# Patient Record
Sex: FEMALE | Employment: FULL TIME | URBAN - METROPOLITAN AREA
[De-identification: names, ages, dates, MRNs, and addresses within clinical notes are randomized per-mention and may not be internally consistent; named-entity substitution may affect disease eponyms.]

---

## 2024-02-20 ENCOUNTER — OFFICE VISIT (OUTPATIENT)
Dept: OBGYN CLINIC | Facility: CLINIC | Age: 39
End: 2024-02-20

## 2024-02-20 VITALS — DIASTOLIC BLOOD PRESSURE: 72 MMHG | WEIGHT: 170.4 LBS | SYSTOLIC BLOOD PRESSURE: 112 MMHG

## 2024-02-20 DIAGNOSIS — Z3A.13 13 WEEKS GESTATION OF PREGNANCY: Primary | ICD-10-CM

## 2024-02-20 DIAGNOSIS — O21.0 HYPEREMESIS AFFECTING PREGNANCY, ANTEPARTUM: ICD-10-CM

## 2024-02-20 LAB
SL AMB  POCT GLUCOSE, UA: NEGATIVE
SL AMB POCT URINE PROTEIN: NEGATIVE

## 2024-02-20 PROCEDURE — 81002 URINALYSIS NONAUTO W/O SCOPE: CPT | Performed by: OBSTETRICS & GYNECOLOGY

## 2024-02-20 PROCEDURE — 99214 OFFICE O/P EST MOD 30 MIN: CPT | Performed by: OBSTETRICS & GYNECOLOGY

## 2024-02-20 RX ORDER — ONDANSETRON 8 MG/1
8 TABLET, ORALLY DISINTEGRATING ORAL EVERY 8 HOURS PRN
Qty: 20 TABLET | Refills: 0 | Status: SHIPPED | OUTPATIENT
Start: 2024-02-20

## 2024-02-20 NOTE — PROGRESS NOTES
Assessment     Iup 13w 3d       Plan     Begin prenatal care       Subjective   Tina Baron is a 38 y.o. female who presents for evaluation of amenorrhea. She believes she could be pregnant. Pregnancy is desired. Sexual Activity: single partner, contraception: none. Current symptoms also include: fatigue, morning sickness, nausea, and positive home pregnancy test. Last period was normal.     No LMP recorded (lmp unknown). Patient is pregnant.  The following portions of the patient's history were reviewed and updated as appropriate: allergies, current medications, past family history, past medical history, past social history, past surgical history, and problem list.    Review of Systems  Pertinent items are noted in HPI.       Objective   /72 (BP Location: Left arm, Patient Position: Sitting, Cuff Size: Standard)   Wt 77.3 kg (170 lb 6.4 oz)   LMP  (LMP Unknown)     General:   alert and oriented, in no acute distress   Heart: regular rate and rhythm, S1, S2 normal, no murmur, click, rub or gallop   Lungs: clear to auscultation bilaterally   Abdomen: soft, non-tender, without masses or organomegaly   Vulva: normal   Vagina: normal mucosa   Cervix: anteverted   Uterus: size consistent with 13 weeks   Adnexa: normal adnexa     Lab Review  Urine HCG: positive

## 2024-02-23 NOTE — PATIENT INSTRUCTIONS
.Congratulations!! Please review our Pregnancy Essential Guide and [facility] L&D Virtual tour from our networks website.     [facility] Pregnancy Essentials Guide  [facility] ([email or web address])     [facility] ([employer])

## 2024-02-26 ENCOUNTER — INITIAL PRENATAL (OUTPATIENT)
Dept: OBGYN CLINIC | Facility: CLINIC | Age: 39
End: 2024-02-26

## 2024-02-26 ENCOUNTER — TELEPHONE (OUTPATIENT)
Age: 39
End: 2024-02-26

## 2024-02-26 VITALS
BODY MASS INDEX: 26.87 KG/M2 | DIASTOLIC BLOOD PRESSURE: 80 MMHG | HEIGHT: 67 IN | SYSTOLIC BLOOD PRESSURE: 110 MMHG | WEIGHT: 171.2 LBS

## 2024-02-26 DIAGNOSIS — Z36.9 ENCOUNTER FOR ANTENATAL SCREENING: Primary | ICD-10-CM

## 2024-02-26 DIAGNOSIS — Z31.430 ENCOUNTER OF FEMALE FOR TESTING FOR GENETIC DISEASE CARRIER STATUS FOR PROCREATIVE MANAGEMENT: ICD-10-CM

## 2024-02-26 DIAGNOSIS — Z34.01 ENCOUNTER FOR SUPERVISION OF NORMAL FIRST PREGNANCY IN FIRST TRIMESTER: ICD-10-CM

## 2024-02-26 PROCEDURE — OBC

## 2024-02-26 NOTE — TELEPHONE ENCOUNTER
Left message on patient answering machine to call office to schedule Detailed US. No LMP noted need to confirm how far along she is.

## 2024-02-26 NOTE — PROGRESS NOTES
.  OB INTAKE INTERVIEW  Patient is 38 y.o.y.o. who presents for OB intake at 14wks 2 Days  She is accompanied by Herself during this encounter  The father of her baby (Rod ) is involved in the pregnancy and is 55 years old.      Last Menstrual Period: 2023  Ultrasound: Measured 13 weeks 1 days on 2024  Estimated Date of Delivery: 2024  Confirmed week  13.1 weeks US    Signs/Symptoms of Pregnancy  Current pregnancy symptoms: Tiredness, nausea, vomiting   Constipation no  Headaches YES, First 3 weeks of pregnancy   Cramping/spotting YES, spotting 5x since beginning of pregnancy   PICA cravings no    Diabetes-  Body mass index is 26.81 kg/m².  If patient has 1 or more, please order early 1 hour GTT  History of GDM no  BMI >35 no  History of PCOS or current metformin use no  History of LGA/macrosomic infant (4000g/9lbs) no    If patient has 2 or more, please order early 1 hour GTT  BMI>30 no  AMA YES  First degree relative with type 2 diabetes no  History of chronic HTN, hyperlipidemia, elevated A1C no  High risk race (, , ,  or ) no    Hypertension- if you answer yes to any of the following, please order baseline preeclampsia labs (cbc, comprehensive metabolic panel, urine protein creatinine ratio, uric acid)  History of of chronic HTN no  History of gestational HTN no  History of preeclampsia, eclampsia, or HELLP syndrome no  History of diabetes no  History of lupus, autoimmune disease, kidney disease no    Thyroid- if yes order TSH with reflex T4, Requested by patient   History of thyroid disease no    Bleeding Disorder or Hx of DVT-patient or first degree relative with history of. Order the following if not done previously.   (Factor V, antithrombin III, prothrombin gene mutation, protein C and S Ag, lupus anticoagulant, anticardiolipin, beta-2 glycoprotein)   no    OB/GYN-  History of abnormal pap smear no       Date of last pap smear  :  History of HPV no  History of Herpes/HSV no  History of other STI (gonorrhea, chlamydia, trich) no  History of prior  no  History of prior  no  History of  delivery prior to 36 weeks 6 days no  History of blood transfusion no  Ok for blood transfusion : NO    Substance screening-   History of tobacco use no  Currently using tobacco no  Substance Use Screen Level : LOW    MRSA Screening-   Does the pt have a hx of MRSA? no    Immunizations:  Influenza vaccine given this season : NO  Discussed Tdap vaccine : Yes  Discussed COVID Vaccine :yes    Genetic/[facility]-  Do you or your partner have a history of any of the following in yourselves or first degree relatives?  Cystic fibrosis no  Spinal muscular atrophy no  Hemoglobinopathy/Sickle Cell/Thalassemia no  Fragile X Intellectual Disability no    Discussed Carrier Screening being completed once in a lifetime as a standard of care lab test.       Appointment for Nuchal Translucency Ultrasound at [facility] scheduled for : patient missed by 1 week , Detailed Ultrasound  2024      Interview education  [facility] Pregnancy Essentials Book reviewed, discussed and attached to their AVS : YES    Nurse/Family Partnership- patient may qualify :NO    Prenatal lab work scripts : yes  Extra labs ordered:   Tsh/Free T 4    Aspirin/Preeclampsia Screen    Risk Level Risk Factor Recommendation   LOW Prior Uncomplicated full-term delivery no No Aspirin recommendation        MODERATE Nulliparity no Recommend low-dose aspirin if     BMI>30 no 2 or more moderate risk factors    Family History Preeclampsia (mother/sister) no     35yr old or greater YES      or Low Socioeconomic no     IVF Pregnancy  no     Personal History Risks (low birth weight, prior adverse preg outcome, >10yr preg interval) no         HIGH History of Preeclampsia no Recommend low-dose aspirin if     Multifetal gestation no 1 or more high risk factors    Chronic HTN no     Type 1  or 2 Diabetes no     Renal Disease no     Autoimmune Disease  no      Contraindications to ASA therapy:  NSAID/ ASA allergy: no  Nasal polyps: no  Asthma with history of ASA induced bronchospasm: no  Relative contraindications:  History of GI bleed: no  Active peptic ulcer disease: no  Severe hepatic dysfunction: no    Patient should be recommended to take ASA 162mg during this pregnancy from 12-36wks to lower her risk of preeclampsia:  Discussed      The patient has a history now or in prior pregnancy notable for:NONE        Details that I feel the provider should be aware of:  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 The patient was oriented to our practice, the navigator role, reviewed delivering physicians and  [facility] for Delivery. All questions were answered.    Interviewed by:  Radha Silva LPN, OB Nurse Navigator

## 2024-02-29 ENCOUNTER — CLINICAL SUPPORT (OUTPATIENT)
Facility: HOSPITAL | Age: 39
End: 2024-02-29

## 2024-02-29 ENCOUNTER — OB ABSTRACT (OUTPATIENT)
Dept: OBGYN CLINIC | Facility: CLINIC | Age: 39
End: 2024-02-29

## 2024-02-29 DIAGNOSIS — Z36.0 ENCOUNTER FOR ANTENATAL SCREENING FOR CHROMOSOMAL ANOMALIES: Primary | ICD-10-CM

## 2024-02-29 DIAGNOSIS — Z3A.14 14 WEEKS GESTATION OF PREGNANCY: ICD-10-CM

## 2024-02-29 PROCEDURE — NC001 PR NO CHARGE

## 2024-02-29 NOTE — PROGRESS NOTES
Patient attended the Genetic Screening Education Session via [employer].    We reviewed basic chromosome information and the increasing risk for aneuploidy based on patient age. Copy number variants (microdeletions/duplications) were also reviewed with a general population risk of 0.4% in any pregnancy.    We discussed the options for prenatal screening and diagnosis for chromosomal abnormalities.     The benefits and limitations of fetal anatomy ultrasound at 20 weeks gestation were reviewed.    Quad screening consists of second trimester biochemical analysis. Results provide a numerical risk for Down syndrome, trisomy 18, and open neural tube defects. Group attendees were instructed to contact their OB office if they desired quad screen.    Cell-free fetal DNA (NIPT) screening analyzes placental DNA in maternal serum and can assess the risk for Down syndrome, trisomy 18, trisomy 13, and sex chromosome anomalies. Results report as screen negative or screen positive, and fetal sex information is provided. The possibility of no-result and increased risk result was addressed. Maternal serum AFP screening is recommended at 16-18 weeks to screen for open neural tube defects.    The benefits and limitations of these screens, including detection rates and false positive rates, were reviewed.    Prenatal diagnostic testing is available via amniocentesis. The timing, risks (including their associated risks of miscarriage) and benefits were reviewed.     Lastly, we reviewed that all pregnancies have a 3-6% risk of birth defect, genetic condition, or intellectual disability regardless of age or personal/family history. There is no available testing to rule out all conditions.     We reviewed potential costs associated with cell-free fetal DNA (NIPT) screen and provided resources, including information to check out of pocket cost with their insurance. The self pay price of $299 was also discussed.     Patient was the only class  attendee so we discussed her testing decisions at the end of the class. A nurse visit for [id number] blood draw was scheduled for 3/1/2024 at our [facility] location at 3:45 PM. She may choose to  a kit and take it to the [facility] outpatient lab as she has other blood work to complete. Reviewed that the NIPT can screen for sex chromosome abnormalities and the fetal sex which the patient elected NOT to learn. Discussed that the results take about 7-10 days and will be available in her MyChart to review. Patient expressed verbal understanding and had no questions. She was encouraged to call the genetic counseling office with any concerns.

## 2024-03-01 ENCOUNTER — CLINICAL SUPPORT (OUTPATIENT)
Facility: HOSPITAL | Age: 39
End: 2024-03-01

## 2024-03-01 ENCOUNTER — APPOINTMENT (OUTPATIENT)
Dept: LAB | Facility: CLINIC | Age: 39
End: 2024-03-01
Payer: COMMERCIAL

## 2024-03-01 DIAGNOSIS — Z36.9 ENCOUNTER FOR ANTENATAL SCREENING: ICD-10-CM

## 2024-03-01 DIAGNOSIS — O09.511 SUPERVISION OF ELDERLY PRIMIGRAVIDA IN FIRST TRIMESTER: ICD-10-CM

## 2024-03-01 DIAGNOSIS — O09.511 SUPERVISION OF ELDERLY PRIMIGRAVIDA IN FIRST TRIMESTER: Primary | ICD-10-CM

## 2024-03-01 DIAGNOSIS — Z92.89 STATUS POST ALCOHOL DETOXIFICATION: ICD-10-CM

## 2024-03-01 DIAGNOSIS — Z00.00 ROUTINE GENERAL MEDICAL EXAMINATION AT A HEALTH CARE FACILITY: ICD-10-CM

## 2024-03-01 DIAGNOSIS — Z11.59 SCREENING EXAMINATION FOR POLIOMYELITIS: ICD-10-CM

## 2024-03-01 DIAGNOSIS — Z11.4 SCREENING FOR HUMAN IMMUNODEFICIENCY VIRUS: ICD-10-CM

## 2024-03-01 DIAGNOSIS — Z34.01 ENCOUNTER FOR SUPERVISION OF NORMAL FIRST PREGNANCY IN FIRST TRIMESTER: ICD-10-CM

## 2024-03-01 LAB
25(OH)D3 SERPL-MCNC: 33.5 NG/ML (ref 30–100)
ABO GROUP BLD: NORMAL
ALBUMIN SERPL BCP-MCNC: 3.8 G/DL (ref 3.5–5)
ALP SERPL-CCNC: 74 U/L (ref 34–104)
ALT SERPL W P-5'-P-CCNC: 58 U/L (ref 7–52)
ANION GAP SERPL CALCULATED.3IONS-SCNC: 8 MMOL/L
AST SERPL W P-5'-P-CCNC: 24 U/L (ref 13–39)
BASOPHILS # BLD AUTO: 0.04 THOUSANDS/ÂΜL (ref 0–0.1)
BASOPHILS NFR BLD AUTO: 0 % (ref 0–1)
BILIRUB SERPL-MCNC: 0.36 MG/DL (ref 0.2–1)
BILIRUB UR QL STRIP: NEGATIVE
BLD GP AB SCN SERPL QL: NEGATIVE
BUN SERPL-MCNC: 4 MG/DL (ref 5–25)
CALCIUM SERPL-MCNC: 9 MG/DL (ref 8.4–10.2)
CHLORIDE SERPL-SCNC: 102 MMOL/L (ref 96–108)
CLARITY UR: CLEAR
CO2 SERPL-SCNC: 23 MMOL/L (ref 21–32)
COLOR UR: NORMAL
CREAT SERPL-MCNC: 0.47 MG/DL (ref 0.6–1.3)
EOSINOPHIL # BLD AUTO: 0.07 THOUSAND/ÂΜL (ref 0–0.61)
EOSINOPHIL NFR BLD AUTO: 1 % (ref 0–6)
ERYTHROCYTE [DISTWIDTH] IN BLOOD BY AUTOMATED COUNT: 12 % (ref 11.6–15.1)
EST. AVERAGE GLUCOSE BLD GHB EST-MCNC: 97 MG/DL
GFR SERPL CREATININE-BSD FRML MDRD: 125 ML/MIN/1.73SQ M
GLUCOSE SERPL-MCNC: 87 MG/DL (ref 65–140)
GLUCOSE UR STRIP-MCNC: NEGATIVE MG/DL
HBA1C MFR BLD: 5 %
HCT VFR BLD AUTO: 37.2 % (ref 34.8–46.1)
HGB BLD-MCNC: 12.8 G/DL (ref 11.5–15.4)
HGB UR QL STRIP.AUTO: NEGATIVE
HIV 1+2 AB+HIV1 P24 AG SERPL QL IA: NORMAL
HIV 2 AB SERPL QL IA: NORMAL
HIV1 AB SERPL QL IA: NORMAL
HIV1 P24 AG SERPL QL IA: NORMAL
IMM GRANULOCYTES # BLD AUTO: 0.06 THOUSAND/UL (ref 0–0.2)
IMM GRANULOCYTES NFR BLD AUTO: 1 % (ref 0–2)
KETONES UR STRIP-MCNC: NEGATIVE MG/DL
LEUKOCYTE ESTERASE UR QL STRIP: NEGATIVE
LYMPHOCYTES # BLD AUTO: 2.5 THOUSANDS/ÂΜL (ref 0.6–4.47)
LYMPHOCYTES NFR BLD AUTO: 25 % (ref 14–44)
MCH RBC QN AUTO: 30.6 PG (ref 26.8–34.3)
MCHC RBC AUTO-ENTMCNC: 34.4 G/DL (ref 31.4–37.4)
MCV RBC AUTO: 89 FL (ref 82–98)
MONOCYTES # BLD AUTO: 0.87 THOUSAND/ÂΜL (ref 0.17–1.22)
MONOCYTES NFR BLD AUTO: 9 % (ref 4–12)
NEUTROPHILS # BLD AUTO: 6.68 THOUSANDS/ÂΜL (ref 1.85–7.62)
NEUTS SEG NFR BLD AUTO: 64 % (ref 43–75)
NITRITE UR QL STRIP: NEGATIVE
NRBC BLD AUTO-RTO: 0 /100 WBCS
PH UR STRIP.AUTO: 6.5 [PH]
PLATELET # BLD AUTO: 309 THOUSANDS/UL (ref 149–390)
PMV BLD AUTO: 9.3 FL (ref 8.9–12.7)
POTASSIUM SERPL-SCNC: 3.7 MMOL/L (ref 3.5–5.3)
PROT SERPL-MCNC: 7.2 G/DL (ref 6.4–8.4)
PROT UR STRIP-MCNC: NEGATIVE MG/DL
RBC # BLD AUTO: 4.18 MILLION/UL (ref 3.81–5.12)
RH BLD: POSITIVE
RUBV IGG SERPL IA-ACNC: 237.6 IU/ML
SODIUM SERPL-SCNC: 133 MMOL/L (ref 135–147)
SP GR UR STRIP.AUTO: 1.01 (ref 1–1.03)
SPECIMEN EXPIRATION DATE: NORMAL
TSH SERPL DL<=0.05 MIU/L-ACNC: 5.08 UIU/ML (ref 0.45–4.5)
UROBILINOGEN UR STRIP-ACNC: <2 MG/DL
VIT B12 SERPL-MCNC: 348 PG/ML (ref 180–914)
VZV IGG SER QL IA: NORMAL
WBC # BLD AUTO: 10.22 THOUSAND/UL (ref 4.31–10.16)

## 2024-03-01 PROCEDURE — 87389 HIV-1 AG W/HIV-1&-2 AB AG IA: CPT

## 2024-03-01 PROCEDURE — 87147 CULTURE TYPE IMMUNOLOGIC: CPT

## 2024-03-01 PROCEDURE — 86787 VARICELLA-ZOSTER ANTIBODY: CPT

## 2024-03-01 PROCEDURE — 86900 BLOOD TYPING SEROLOGIC ABO: CPT

## 2024-03-01 PROCEDURE — 83020 HEMOGLOBIN ELECTROPHORESIS: CPT

## 2024-03-01 PROCEDURE — 86706 HEP B SURFACE ANTIBODY: CPT

## 2024-03-01 PROCEDURE — 84439 ASSAY OF FREE THYROXINE: CPT

## 2024-03-01 PROCEDURE — 36415 COLL VENOUS BLD VENIPUNCTURE: CPT

## 2024-03-01 PROCEDURE — 80053 COMPREHEN METABOLIC PANEL: CPT

## 2024-03-01 PROCEDURE — 87340 HEPATITIS B SURFACE AG IA: CPT

## 2024-03-01 PROCEDURE — 86762 RUBELLA ANTIBODY: CPT

## 2024-03-01 PROCEDURE — 81003 URINALYSIS AUTO W/O SCOPE: CPT

## 2024-03-01 PROCEDURE — 86780 TREPONEMA PALLIDUM: CPT

## 2024-03-01 PROCEDURE — 86480 TB TEST CELL IMMUN MEASURE: CPT

## 2024-03-01 PROCEDURE — 82607 VITAMIN B-12: CPT

## 2024-03-01 PROCEDURE — 85025 COMPLETE CBC W/AUTO DIFF WBC: CPT

## 2024-03-01 PROCEDURE — 84443 ASSAY THYROID STIM HORMONE: CPT

## 2024-03-01 PROCEDURE — 86901 BLOOD TYPING SEROLOGIC RH(D): CPT

## 2024-03-01 PROCEDURE — 82306 VITAMIN D 25 HYDROXY: CPT

## 2024-03-01 PROCEDURE — 86803 HEPATITIS C AB TEST: CPT

## 2024-03-01 PROCEDURE — 83036 HEMOGLOBIN GLYCOSYLATED A1C: CPT

## 2024-03-01 PROCEDURE — 87086 URINE CULTURE/COLONY COUNT: CPT

## 2024-03-01 PROCEDURE — 86850 RBC ANTIBODY SCREEN: CPT

## 2024-03-01 NOTE — PROGRESS NOTES
Patient chose to have [facility] [id number] Non-Invasive Prenatal Screen 704404 MT21 PLUS Core + SCA, NO gender.  Patient choose billed through insurance.     Patient given brochure and is aware [facility] will contact patient's insurance and coordinate coverage.  Provided [facility] contact information. General inquiries [phone], Cost estimates [phone] and [facility] Billing [phone]. Website [email or web address].     Blood collection tubes labeled with patient identifiers (name, medical record number, and date of birth).     Filled out [facility] order form. Patient chose to be given a test kit. Patient instructed to take to a [facility] lab for blood collection..   If patient chose to have blood work drawn at a [facility] lab we requested patient notify MFM (via phone call or VeriTweet message) when blood collected so office can follow up on results.     Copy of lab order scanned to Epic media.     Maternal Fetal Medicine will have results in approximately 5-7 business days and will call patient or notify via VeriTweet.  Patient aware viewing lab result online will reveal fetal sex if ordered.    Patient verbalized understanding of all instructions and no questions at this time.

## 2024-03-02 LAB
HBV SURFACE AB SER-ACNC: >500 MIU/ML
HBV SURFACE AG SER QL: NORMAL
HCV AB SER QL: NORMAL
T4 FREE SERPL-MCNC: 0.82 NG/DL (ref 0.61–1.12)
TREPONEMA PALLIDUM IGG+IGM AB [PRESENCE] IN SERUM OR PLASMA BY IMMUNOASSAY: NORMAL

## 2024-03-03 LAB
BACTERIA UR CULT: ABNORMAL
BACTERIA UR CULT: ABNORMAL
GAMMA INTERFERON BACKGROUND BLD IA-ACNC: 0.2 IU/ML
M TB IFN-G BLD-IMP: NEGATIVE
M TB IFN-G CD4+ BCKGRND COR BLD-ACNC: 0.06 IU/ML
M TB IFN-G CD4+ BCKGRND COR BLD-ACNC: 0.08 IU/ML
MITOGEN IGNF BCKGRD COR BLD-ACNC: 9.8 IU/ML

## 2024-03-05 ENCOUNTER — TELEPHONE (OUTPATIENT)
Dept: OBGYN CLINIC | Facility: CLINIC | Age: 39
End: 2024-03-05

## 2024-03-05 LAB — MISCELLANEOUS LAB TEST RESULT: NORMAL

## 2024-03-05 NOTE — TELEPHONE ENCOUNTER
Pt called back. Pt awaiting for call back for OB assessment. Pt stated she will be on break 1-2pm.

## 2024-03-05 NOTE — TELEPHONE ENCOUNTER
.Overall how are you doing? Has some nausea,lower back pain, itchy skin headaches,rash on her legs that itches,has elevated levels of thyroid hormone from living close to [facility]     Compliant with routine OB care appointments? yes    Have you completed your 1st trimester labs? yes    If you had NIPS with MFM, do you have a order for MSAFP? yes   Can be completed 15w-22w9d, ideally 16w-18w    Have you seen MFM and do you have your detailed US scheduled? 4/16/2024    Pregnancy Education-have you had a chance to review the classes offered and registered? No not yet    EPDS Score : zero

## 2024-03-05 NOTE — TELEPHONE ENCOUNTER
Has some nausea,lower back pain, itchy skin headaches,rash on her legs that itches,has elevated levels of thyroid hormone from living close to [place],is their medication she should be taking

## 2024-03-05 NOTE — TELEPHONE ENCOUNTER
Called the patient and left a voice message on her machine to return the call for a 2nd trimester OB assessment

## 2024-03-06 LAB
HGB A MFR BLD: 2.6 % (ref 1.8–3.2)
HGB A MFR BLD: 97.4 % (ref 96.4–98.8)
HGB F MFR BLD: 0 % (ref 0–2)
HGB FRACT BLD-IMP: NORMAL
HGB S MFR BLD: 0 %

## 2024-03-07 ENCOUNTER — TELEPHONE (OUTPATIENT)
Age: 39
End: 2024-03-07

## 2024-03-07 DIAGNOSIS — R79.89 ELEVATED TSH: Primary | ICD-10-CM

## 2024-03-07 LAB
CFDNA.FET/CFDNA.TOTAL SFR FETUS: NORMAL %
CITATION REF LAB TEST: NORMAL
FET 13+18+21+X+Y ANEUP PLAS.CFDNA: NEGATIVE
FET CHR 21 TS PLAS.CFDNA QL: NEGATIVE
FET CHR 21 TS PLAS.CFDNA QL: NEGATIVE
FET MS X RISK WBC.DNA+CFDNA QL: NOT DETECTED
FET SEX PLAS.CFDNA DOSAGE CFDNA: NORMAL
FET TS 13 RISK PLAS.CFDNA QL: NEGATIVE
FET X + Y ANEUP RISK PLAS.CFDNA SEQ-IMP: NOT DETECTED
GA EST FROM CONCEPTION DATE: NORMAL D
GESTATIONAL AGE > 9:: YES
LAB DIRECTOR NAME PROVIDER: NORMAL
LAB DIRECTOR NAME PROVIDER: NORMAL
LABORATORY COMMENT REPORT: NORMAL
LIMITATIONS OF THE TEST: NORMAL
NEGATIVE PREDICTIVE VALUE: NORMAL
PERFORMANCE CHARACTERISTICS: NORMAL
POSITIVE PREDICTIVE VALUE: NORMAL
REF LAB TEST METHOD: NORMAL
SL AMB NOTE:: NORMAL
TEST PERFORMANCE INFO SPEC: NORMAL

## 2024-03-07 NOTE — TELEPHONE ENCOUNTER
----- Message from Jamal Regalado MD sent at 3/7/2024  1:05 PM EST -----  I have reviewed the results of the NIPS which are low risk.  Please call patient and notify her of these reassuring results if she has not viewed on MyChart. Please ensure she is notified of recommendation of MSAFP to be ordered and followed up through her primary Obstetrician's office.      Thank you, Jamal Regalado MD

## 2024-03-07 NOTE — TELEPHONE ENCOUNTER
Spoke with the patient and made aware of results and recommendations, she will check with her insurance to see which endocrinologist she can go to and she will call them to schedule an appointment

## 2024-03-07 NOTE — RESULT ENCOUNTER NOTE
I have reviewed the results of the NIPS which are low risk.  Please call patient and notify her of these reassuring results if she has not viewed on MyChart. Please ensure she is notified of recommendation of MSAFP to be ordered and followed up through her primary Obstetrician's office.      Thank you, Jamal Regalado MD

## 2024-03-18 ENCOUNTER — INITIAL PRENATAL (OUTPATIENT)
Dept: OBGYN CLINIC | Facility: CLINIC | Age: 39
End: 2024-03-18
Payer: COMMERCIAL

## 2024-03-18 VITALS — DIASTOLIC BLOOD PRESSURE: 64 MMHG | BODY MASS INDEX: 27.72 KG/M2 | WEIGHT: 177 LBS | SYSTOLIC BLOOD PRESSURE: 106 MMHG

## 2024-03-18 DIAGNOSIS — Z3A.17 17 WEEKS GESTATION OF PREGNANCY: Primary | ICD-10-CM

## 2024-03-18 LAB
SL AMB  POCT GLUCOSE, UA: NEGATIVE
SL AMB LEUKOCYTE ESTERASE,UA: NEGATIVE
SL AMB POCT CLARITY,UA: CLEAR
SL AMB POCT COLOR,UA: NORMAL
SL AMB POCT NITRITE,UA: NEGATIVE
SL AMB POCT URINE PROTEIN: NEGATIVE

## 2024-03-18 PROCEDURE — PNV: Performed by: OBSTETRICS & GYNECOLOGY

## 2024-03-18 PROCEDURE — 87591 N.GONORRHOEAE DNA AMP PROB: CPT | Performed by: OBSTETRICS & GYNECOLOGY

## 2024-03-18 PROCEDURE — 87491 CHLMYD TRACH DNA AMP PROBE: CPT | Performed by: OBSTETRICS & GYNECOLOGY

## 2024-03-18 PROCEDURE — G0476 HPV COMBO ASSAY CA SCREEN: HCPCS | Performed by: OBSTETRICS & GYNECOLOGY

## 2024-03-18 PROCEDURE — G0145 SCR C/V CYTO,THINLAYER,RESCR: HCPCS | Performed by: OBSTETRICS & GYNECOLOGY

## 2024-03-18 PROCEDURE — 81003 URINALYSIS AUTO W/O SCOPE: CPT | Performed by: OBSTETRICS & GYNECOLOGY

## 2024-03-18 NOTE — PROGRESS NOTES
Patient is a 38-year-old  1 para 0 who presents at 17 weeks 0 days gestation for routine prenatal exam.  She is persistent nausea and vomiting.  She has not as of yet felt fetal movement.  She denies leaking of fluid, vaginal bleeding, contractions or pain.  Will see her back in 4 weeks or as needed.

## 2024-03-18 NOTE — PROGRESS NOTES
Tina Baron is here for her initial ob appt, pap and GC/c ordered and to be colected today. Pt is approxiamately 17 wks today.  Pt is having a lot of nausea, weird taste in mouth, ears hurt.  Pt isn't feeling any movement yet.  Pt denies any LOF, spotting, cramping. Pt did think that she was getting an U/S today, and is very nervous to have an internal exam.

## 2024-03-19 LAB
HPV HR 12 DNA CVX QL NAA+PROBE: NEGATIVE
HPV16 DNA CVX QL NAA+PROBE: NEGATIVE
HPV18 DNA CVX QL NAA+PROBE: NEGATIVE

## 2024-03-21 LAB
C TRACH DNA SPEC QL NAA+PROBE: NEGATIVE
N GONORRHOEA DNA SPEC QL NAA+PROBE: NEGATIVE

## 2024-03-22 LAB
LAB AP GYN PRIMARY INTERPRETATION: NORMAL
LAB AP LMP: NORMAL
Lab: NORMAL

## 2024-04-04 ENCOUNTER — APPOINTMENT (OUTPATIENT)
Dept: LAB | Facility: CLINIC | Age: 39
End: 2024-04-04
Payer: COMMERCIAL

## 2024-04-04 DIAGNOSIS — Z3A.17 17 WEEKS GESTATION OF PREGNANCY: ICD-10-CM

## 2024-04-04 PROCEDURE — 82105 ALPHA-FETOPROTEIN SERUM: CPT

## 2024-04-04 PROCEDURE — 36415 COLL VENOUS BLD VENIPUNCTURE: CPT

## 2024-04-06 LAB
2ND TRIMESTER 4 SCREEN SERPL-IMP: NORMAL
AFP ADJ MOM SERPL: 1.17
AFP INTERP AMN-IMP: NORMAL
AFP INTERP SERPL-IMP: NORMAL
AFP INTERP SERPL-IMP: NORMAL
AFP SERPL-MCNC: 57.9 NG/ML
AGE AT DELIVERY: 39.2 YR
GA METHOD: NORMAL
GA: 19.6 WEEKS
IDDM PATIENT QL: NO
MULTIPLE PREGNANCY: NO
NEURAL TUBE DEFECT RISK FETUS: 7137 %

## 2024-04-16 ENCOUNTER — ROUTINE PRENATAL (OUTPATIENT)
Facility: HOSPITAL | Age: 39
End: 2024-04-16
Payer: COMMERCIAL

## 2024-04-16 VITALS
BODY MASS INDEX: 28.06 KG/M2 | HEART RATE: 95 BPM | HEIGHT: 67 IN | SYSTOLIC BLOOD PRESSURE: 120 MMHG | WEIGHT: 178.79 LBS | DIASTOLIC BLOOD PRESSURE: 82 MMHG

## 2024-04-16 DIAGNOSIS — O09.512 AMA (ADVANCED MATERNAL AGE) PRIMIGRAVIDA 35+, SECOND TRIMESTER: ICD-10-CM

## 2024-04-16 DIAGNOSIS — Z36.3 ENCOUNTER FOR ANTENATAL SCREENING FOR MALFORMATIONS: ICD-10-CM

## 2024-04-16 DIAGNOSIS — Z36.86 ENCOUNTER FOR ANTENATAL SCREENING FOR CERVICAL LENGTH: Primary | ICD-10-CM

## 2024-04-16 DIAGNOSIS — Z3A.21 21 WEEKS GESTATION OF PREGNANCY: ICD-10-CM

## 2024-04-16 PROBLEM — Z3A.20 20 WEEKS GESTATION OF PREGNANCY: Status: ACTIVE | Noted: 2024-04-16

## 2024-04-16 PROCEDURE — 76817 TRANSVAGINAL US OBSTETRIC: CPT | Performed by: OBSTETRICS & GYNECOLOGY

## 2024-04-16 PROCEDURE — 76811 OB US DETAILED SNGL FETUS: CPT | Performed by: OBSTETRICS & GYNECOLOGY

## 2024-04-16 PROCEDURE — 99243 OFF/OP CNSLTJ NEW/EST LOW 30: CPT | Performed by: OBSTETRICS & GYNECOLOGY

## 2024-04-16 RX ORDER — ASPIRIN 81 MG/1
162 TABLET, CHEWABLE ORAL DAILY
Qty: 60 TABLET | Refills: 4 | Status: SHIPPED | OUTPATIENT
Start: 2024-04-16

## 2024-04-16 NOTE — PROGRESS NOTES
"[facility]: Ms. Baron was seen today at 21w3d for anatomic survey and cervical length screening ultrasound.  See ultrasound report under \"OB Procedures\" tab.      My recommendations are as follows:  Although encouraging, even a normal-appearing ultrasound cannot exclude all malformations, or the possibility of a genetic syndrome.      The use of low dose aspirin in pregnancy (162mg) is recommended in women with a high risk, or multiple moderate risk factors for preeclampsia. Aspirin therapy should be initiated between 12-28 weeks gestation, and is most effective if started prior to 16 weeks gestation, and stopped by 36 weeks gestation. Low dose aspirin in pregnancy has been shown to reduce the incidence of preeclampsia in women with risk factors, and has been shown to be safe and without significant maternal or fetal risk. In light of her risk factors which include primigravia and maternal age, I recommend initiating aspirin therapy.     Advanced Maternal Age (AMA) is defined as maternal age 35 or greater at EDC. Advanced maternal age is associated with an increased risk of several pregnancy outcomes, including aneuploidy/genetic syndromes, poor fetal growth, stillbirth, maternal hypertensive disorders, and gestational diabetes.  Risk of adverse outcomes is proportional to patient age. Despite these increased risks, many women of advanced maternal age have normal, healthy pregnancy outcomes, particularly if they have no co-existing medical conditions.  For women age 35 and older, we recommend an ultrasound at 30-32 weeks to assess fetal growth for the indication of advanced maternal age.     Please don't hesitate to contact our office with any concerns or questions.    -Lynne Boles MD      "

## 2024-04-16 NOTE — LETTER
"2024     Xiomara Francois MD  [address]  [address]  [place] [address]    Patient: Tina Baron   YOB: 1985   Date of Visit: 2024       Dear Dr. Francois:    Thank you for referring Tina Baron to me for evaluation. Below are my notes for this consultation.    If you have questions, please do not hesitate to call me. I look forward to following your patient along with you.         Sincerely,        Lynne Boles MD        CC: No Recipients    Lynne Boles MD  2024  5:27 PM  Sign when Signing Visit  [facility]: Ms. Baron was seen today at 21w3d for anatomic survey and cervical length screening ultrasound.  See ultrasound report under \"OB Procedures\" tab.      My recommendations are as follows:  Although encouraging, even a normal-appearing ultrasound cannot exclude all malformations, or the possibility of a genetic syndrome.      The use of low dose aspirin in pregnancy (162mg) is recommended in women with a high risk, or multiple moderate risk factors for preeclampsia. Aspirin therapy should be initiated between 12-28 weeks gestation, and is most effective if started prior to 16 weeks gestation, and stopped by 36 weeks gestation. Low dose aspirin in pregnancy has been shown to reduce the incidence of preeclampsia in women with risk factors, and has been shown to be safe and without significant maternal or fetal risk. In light of her risk factors which include primigravia and maternal age, I recommend initiating aspirin therapy.     Advanced Maternal Age (AMA) is defined as maternal age 35 or greater at EDC. Advanced maternal age is associated with an increased risk of several pregnancy outcomes, including aneuploidy/genetic syndromes, poor fetal growth, stillbirth, maternal hypertensive disorders, and gestational diabetes.  Risk of adverse outcomes is proportional to patient age. Despite these increased risks, " many women of advanced maternal age have normal, healthy pregnancy outcomes, particularly if they have no co-existing medical conditions.  For women age 35 and older, we recommend an ultrasound at 30-32 weeks to assess fetal growth for the indication of advanced maternal age.     Please don't hesitate to contact our office with any concerns or questions.    -MD Sharif Lobo MD  4/16/2024 10:43 AM  Sign when Signing Visit  Note entered in error t

## 2024-04-16 NOTE — PATIENT INSTRUCTIONS
Please call [phone] to inquire regarding prior authorization for cystic fibrosis and spinal muscular atrophy testing.    The use of low dose aspirin in pregnancy (162mg) is recommended in women with a high risk, or multiple moderate risk factors for preeclampsia. Aspirin therapy should be initiated between 12-28 weeks gestation, and is most effective if started prior to 16 weeks gestation, and continued until 36 weeks gestation. Low dose aspirin in pregnancy has been shown to reduce the incidence of preeclampsia in women with risk factors, and has been shown to be safe and without significant maternal or fetal risk. In light of your risk factors for preeclampsia, including: Primiparity (first pregnancy) and Maternal Age 35 or greater I recommend initiating aspirin therapy.

## 2024-04-17 ENCOUNTER — ROUTINE PRENATAL (OUTPATIENT)
Dept: OBGYN CLINIC | Facility: CLINIC | Age: 39
End: 2024-04-17
Payer: COMMERCIAL

## 2024-04-17 ENCOUNTER — TELEPHONE (OUTPATIENT)
Facility: HOSPITAL | Age: 39
End: 2024-04-17

## 2024-04-17 VITALS
BODY MASS INDEX: 28.41 KG/M2 | HEIGHT: 67 IN | SYSTOLIC BLOOD PRESSURE: 122 MMHG | DIASTOLIC BLOOD PRESSURE: 74 MMHG | WEIGHT: 181 LBS

## 2024-04-17 DIAGNOSIS — Z3A.21 21 WEEKS GESTATION OF PREGNANCY: Primary | ICD-10-CM

## 2024-04-17 LAB
SL AMB  POCT GLUCOSE, UA: NORMAL
SL AMB LEUKOCYTE ESTERASE,UA: NORMAL
SL AMB POCT NITRITE,UA: NORMAL
SL AMB POCT URINE PROTEIN: NORMAL

## 2024-04-17 PROCEDURE — PNV: Performed by: PHYSICIAN ASSISTANT

## 2024-04-17 PROCEDURE — 81002 URINALYSIS NONAUTO W/O SCOPE: CPT | Performed by: PHYSICIAN ASSISTANT

## 2024-04-17 NOTE — PROGRESS NOTES
Assessment     Pregnancy 21 and 4/7 weeks     Plan     Routine OB visit doing well overall.  Patient noting some headaches but is primarily triggered by using computer when working or with weather.  Can consider use of appropriate glasses for electronics, headache management includes Tylenol, magnesium, staying well-hydrated.  We will monitor.  Nausea/vomiting has improved  Some fatigue which could be part of pregnancy, encouraged continuing with vitamins, staying well-hydrated, well-balanced diet and exercise/activity as tolerated.  Of note, patient had no evidence of anemia with prenatal labs.    Patient completed prenatal labs with slightly elevated TSH 5.081, normal free T4.  She has already been referred to endocrinology with appointment scheduled May 31.    NIPS/AFP were negative.    Patient saw MFM yesterday for level 2 which was normal.  Follow-up ultrasound scheduled 7/2.    Blood pressure today 122/74,  with fetal movement  AB+ blood type  Urine dip trace protein otherwise unremarkable.    Patient encouraged to continue PNV, to  ASA and start today.    Follow up in 4 weeks.      Chief Complaint   Patient presents with    Routine Prenatal Visit     Pt is here for routine prenatal visit. Pt states she has been having fatigue, headaches, and dizziness. Pt states her nausea has subsided. Pt denies VB and LOF.         Subjective     Tina Baron is a 38 y.o. female being seen today for her obstetrical visit.   Desires scheduled csection.  She is at 21w4d gestation. Patient reports no bleeding, no contractions, no cramping, and no leaking. Fetal movement:  possibly starting to appreciate .    Has been having HA's daily when working, rare at home or with weather.  She has no associated sxs aside from light sensitivity.  Did have some nausea/vomiting. States vomiting stopped about 1.5 weeks ago.  Denies dizziness but having sleepiness. She feels she is getting good quality sleep.  Also states sick  "recently.  She is taking nothing for HA's.   She does have some nasal congestion using nasal saline.    Taking PNV daily.  She will be starting ASA - plans on p/u prescription today.    Menstrual History:  OB History          1    Para   0    Term   0       0    AB   0    Living   0         SAB   0    IAB   0    Ectopic   0    Multiple   0    Live Births   0           Obstetric Comments   Menarche 14              Menarche age: N/A  Patient's last menstrual period was 2023.       The following portions of the patient's history were reviewed and updated as appropriate: allergies, current medications, past family history, past medical history, past social history, past surgical history, and problem list.    Review of Systems  Pertinent items are noted in HPI.     Objective      /74 (BP Location: Left arm, Patient Position: Sitting, Cuff Size: Adult)   Ht 5' 7\" (1.702 m)   Wt 82.1 kg (181 lb)   LMP 2023   BMI 28.35 kg/m²   FHT: 145 BPM   Uterine Size: 21 cm and size equals dates            "

## 2024-04-17 NOTE — TELEPHONE ENCOUNTER
Called PT to provide her w/the prior auth number for the SMA and CF testing that was ordered for her. Provided phone number [phone] and PT verbalized understanding and said she would call them to obtain prior auth for these tests.

## 2024-04-26 ENCOUNTER — APPOINTMENT (OUTPATIENT)
Dept: LAB | Facility: CLINIC | Age: 39
End: 2024-04-26
Payer: COMMERCIAL

## 2024-04-26 DIAGNOSIS — Z31.430 ENCOUNTER OF FEMALE FOR TESTING FOR GENETIC DISEASE CARRIER STATUS FOR PROCREATIVE MANAGEMENT: ICD-10-CM

## 2024-04-26 DIAGNOSIS — Z36.9 ENCOUNTER FOR ANTENATAL SCREENING: ICD-10-CM

## 2024-04-26 PROCEDURE — 36415 COLL VENOUS BLD VENIPUNCTURE: CPT

## 2024-04-26 PROCEDURE — 81220 CFTR GENE COM VARIANTS: CPT

## 2024-04-26 PROCEDURE — 81329 SMN1 GENE DOS/DELETION ALYS: CPT

## 2024-05-02 ENCOUNTER — OB ABSTRACT (OUTPATIENT)
Dept: OBGYN CLINIC | Facility: CLINIC | Age: 39
End: 2024-05-02

## 2024-05-02 LAB
CITATION REF LAB TEST: NORMAL
CLINICAL INFO: NORMAL
ETHNIC BACKGROUND STATED: NORMAL
GENE DIS ANL CARRIER INTERP-IMP: NORMAL
GENE MUT TESTED BLD/T: NORMAL
LAB DIRECTOR NAME PROVIDER: NORMAL
REASON FOR REFERRAL (NARRATIVE): NORMAL
RECOMMENDATION PATIENT DOC-IMP: NORMAL
REF LAB TEST METHOD: NORMAL
SERVICE CMNT-IMP: NORMAL
SMN1 GENE MUT ANL BLD/T: NORMAL
SPECIMEN SOURCE: NORMAL

## 2024-05-09 LAB
CFTR FULL MUT ANL BLD/T SEQ: NORMAL
CITATION REF LAB TEST: NORMAL
CLINICAL INFO: NORMAL
ETHNIC BACKGROUND STATED: NORMAL
GENE DIS ANL CARRIER INTERP-IMP: NORMAL
INDICATION: NORMAL
LAB DIRECTOR NAME PROVIDER: NORMAL
RECOMMENDATION PATIENT DOC-IMP: NORMAL
REF LAB TEST METHOD: NORMAL
SERVICE CMNT-IMP: NORMAL
SPECIMEN SOURCE: NORMAL

## 2024-05-16 ENCOUNTER — ROUTINE PRENATAL (OUTPATIENT)
Dept: OBGYN CLINIC | Facility: CLINIC | Age: 39
End: 2024-05-16
Payer: COMMERCIAL

## 2024-05-16 VITALS
SYSTOLIC BLOOD PRESSURE: 110 MMHG | HEIGHT: 67 IN | DIASTOLIC BLOOD PRESSURE: 78 MMHG | WEIGHT: 189.2 LBS | BODY MASS INDEX: 29.7 KG/M2

## 2024-05-16 DIAGNOSIS — O09.512 AMA (ADVANCED MATERNAL AGE) PRIMIGRAVIDA 35+, SECOND TRIMESTER: ICD-10-CM

## 2024-05-16 DIAGNOSIS — Z3A.25 25 WEEKS GESTATION OF PREGNANCY: Primary | ICD-10-CM

## 2024-05-16 DIAGNOSIS — Z36.9 ANTENATAL SCREENING ENCOUNTER: ICD-10-CM

## 2024-05-16 LAB
SL AMB  POCT GLUCOSE, UA: NORMAL
SL AMB LEUKOCYTE ESTERASE,UA: NORMAL
SL AMB POCT BILIRUBIN,UA: NORMAL
SL AMB POCT BLOOD,UA: NORMAL
SL AMB POCT KETONES,UA: NORMAL
SL AMB POCT NITRITE,UA: NORMAL
SL AMB POCT PH,UA: 6.5
SL AMB POCT SPECIFIC GRAVITY,UA: 1000
SL AMB POCT URINE PROTEIN: NORMAL
SL AMB POCT UROBILINOGEN: NORMAL

## 2024-05-16 PROCEDURE — 81002 URINALYSIS NONAUTO W/O SCOPE: CPT | Performed by: PHYSICIAN ASSISTANT

## 2024-05-16 PROCEDURE — PNV: Performed by: PHYSICIAN ASSISTANT

## 2024-05-16 RX ORDER — MAGNESIUM 30 MG
30 TABLET ORAL 2 TIMES DAILY
COMMUNITY

## 2024-05-16 NOTE — PROGRESS NOTES
Assessment     Pregnancy 25 and 5/7 weeks     Plan     Routine OB visit doing well overall.  She does note some cloudy urine but denies any other specific urinary symptoms.  Full urinalysis dip done today and was completely normal.  Otherwise she reports headache and some fatigue improved since last visit with magnesium.    Blood pressure today 110/78,  with fetal movement appreciated.  AB+ blood type    Patient reminded of M follow-up ultrasound .  Patient also has endocrinology appointment coming up May 31 for mildly elevated TSH, normal free T4 referred by Dr. Francois.    Patient to continue PNV, ASA daily.  Stressed importance of staying well-hydrated drinking plenty of water, well-balanced diet, exercise as tolerated.  Patient had some questions today which were answered to her satisfaction.  Patient does desire specific delivery date for scheduled  and would recommend bringing this up to Dr. Francois at her next visit.    Patient also asking question regarding pediatrician and vaccinations.  I did provide her with yellow folder and reviewed all contents inside today.    28-week labs ordered to be completed prior to next visit.  Follow up in 4 weeks.      Chief Complaint   Patient presents with    Routine Prenatal Visit        Subjective     Tina Baron is a 38 y.o. female being seen today for her obstetrical visit. She is at 25w5d gestation.  She is feeling well overall.  Patient reports no bleeding, no contractions, no cramping, and no leaking.  She denies any new headache, blurry vision, dizziness, abdominal pain, nausea/vomiting or significant reflux.  Denies swelling.  Normal urination and bowel movement fetal movement:  Present .    She does note some cloudy urine, no hematuria urianry frequency/urgency, falank pain, fevers, N/V, abd pain, dyauria.     Menstrual History:  OB History          1    Para   0    Term   0       0    AB   0    Living   0         SAB   0  "   IAB   0    Ectopic   0    Multiple   0    Live Births   0           Obstetric Comments   Menarche 14              Menarche age: N/A  Patient's last menstrual period was 11/18/2023.       The following portions of the patient's history were reviewed and updated as appropriate: allergies, current medications, past family history, past medical history, past social history, past surgical history, and problem list.    Review of Systems  Pertinent items are noted in HPI.     Objective      /78 (BP Location: Left arm, Patient Position: Sitting, Cuff Size: Adult)   Ht 5' 7\" (1.702 m)   Wt 85.8 kg (189 lb 3.2 oz)   LMP 11/18/2023   BMI 29.63 kg/m²   FHT: 148 BPM   Uterine Size: 25 cm and size equals dates            "

## 2024-06-07 ENCOUNTER — APPOINTMENT (OUTPATIENT)
Dept: LAB | Facility: CLINIC | Age: 39
End: 2024-06-07
Payer: COMMERCIAL

## 2024-06-07 DIAGNOSIS — Z00.00 ROUTINE GENERAL MEDICAL EXAMINATION AT A HEALTH CARE FACILITY: ICD-10-CM

## 2024-06-07 DIAGNOSIS — Z11.59 SCREENING EXAMINATION FOR POLIOMYELITIS: ICD-10-CM

## 2024-06-07 DIAGNOSIS — Z92.89 STATUS POST ALCOHOL DETOXIFICATION: ICD-10-CM

## 2024-06-07 DIAGNOSIS — Z11.4 SCREENING FOR HUMAN IMMUNODEFICIENCY VIRUS: ICD-10-CM

## 2024-06-07 DIAGNOSIS — Z36.9 ANTENATAL SCREENING ENCOUNTER: ICD-10-CM

## 2024-06-07 LAB
BASOPHILS # BLD AUTO: 0.04 THOUSANDS/ÂΜL (ref 0–0.1)
BASOPHILS NFR BLD AUTO: 1 % (ref 0–1)
CHOLEST SERPL-MCNC: 336 MG/DL
EOSINOPHIL # BLD AUTO: 0.03 THOUSAND/ÂΜL (ref 0–0.61)
EOSINOPHIL NFR BLD AUTO: 0 % (ref 0–6)
ERYTHROCYTE [DISTWIDTH] IN BLOOD BY AUTOMATED COUNT: 12.9 % (ref 11.6–15.1)
GLUCOSE 1H P 50 G GLC PO SERPL-MCNC: 120 MG/DL (ref 70–134)
HCT VFR BLD AUTO: 32.3 % (ref 34.8–46.1)
HDLC SERPL-MCNC: 75 MG/DL
HGB BLD-MCNC: 10.8 G/DL (ref 11.5–15.4)
IMM GRANULOCYTES # BLD AUTO: 0.1 THOUSAND/UL (ref 0–0.2)
IMM GRANULOCYTES NFR BLD AUTO: 1 % (ref 0–2)
LDLC SERPL CALC-MCNC: 214 MG/DL (ref 0–100)
LYMPHOCYTES # BLD AUTO: 1.61 THOUSANDS/ÂΜL (ref 0.6–4.47)
LYMPHOCYTES NFR BLD AUTO: 19 % (ref 14–44)
MCH RBC QN AUTO: 31.7 PG (ref 26.8–34.3)
MCHC RBC AUTO-ENTMCNC: 33.4 G/DL (ref 31.4–37.4)
MCV RBC AUTO: 95 FL (ref 82–98)
MONOCYTES # BLD AUTO: 0.67 THOUSAND/ÂΜL (ref 0.17–1.22)
MONOCYTES NFR BLD AUTO: 8 % (ref 4–12)
NEUTROPHILS # BLD AUTO: 6.07 THOUSANDS/ÂΜL (ref 1.85–7.62)
NEUTS SEG NFR BLD AUTO: 71 % (ref 43–75)
NONHDLC SERPL-MCNC: 261 MG/DL
NRBC BLD AUTO-RTO: 0 /100 WBCS
PLATELET # BLD AUTO: 251 THOUSANDS/UL (ref 149–390)
PMV BLD AUTO: 10.4 FL (ref 8.9–12.7)
RBC # BLD AUTO: 3.41 MILLION/UL (ref 3.81–5.12)
TREPONEMA PALLIDUM IGG+IGM AB [PRESENCE] IN SERUM OR PLASMA BY IMMUNOASSAY: NORMAL
TRIGL SERPL-MCNC: 235 MG/DL
WBC # BLD AUTO: 8.52 THOUSAND/UL (ref 4.31–10.16)

## 2024-06-07 PROCEDURE — 86780 TREPONEMA PALLIDUM: CPT

## 2024-06-07 PROCEDURE — 82950 GLUCOSE TEST: CPT

## 2024-06-07 PROCEDURE — 80061 LIPID PANEL: CPT

## 2024-06-07 PROCEDURE — 36415 COLL VENOUS BLD VENIPUNCTURE: CPT

## 2024-06-07 PROCEDURE — 85025 COMPLETE CBC W/AUTO DIFF WBC: CPT

## 2024-06-08 ENCOUNTER — CLINICAL SUPPORT (OUTPATIENT)
Age: 39
End: 2024-06-08

## 2024-06-08 DIAGNOSIS — Z32.2 ENCOUNTER FOR CHILDBIRTH INSTRUCTION: Primary | ICD-10-CM

## 2024-06-11 ENCOUNTER — ROUTINE PRENATAL (OUTPATIENT)
Dept: OBGYN CLINIC | Facility: CLINIC | Age: 39
End: 2024-06-11
Payer: COMMERCIAL

## 2024-06-11 VITALS — DIASTOLIC BLOOD PRESSURE: 70 MMHG | BODY MASS INDEX: 30.38 KG/M2 | SYSTOLIC BLOOD PRESSURE: 114 MMHG | WEIGHT: 194 LBS

## 2024-06-11 DIAGNOSIS — Z3A.29 29 WEEKS GESTATION OF PREGNANCY: Primary | ICD-10-CM

## 2024-06-11 PROCEDURE — PNV: Performed by: OBSTETRICS & GYNECOLOGY

## 2024-06-11 PROCEDURE — 81003 URINALYSIS AUTO W/O SCOPE: CPT | Performed by: OBSTETRICS & GYNECOLOGY

## 2024-06-11 NOTE — PROGRESS NOTES
Tina Baron is [id number], here for her routien ob appt; pt denies any LOF, VB, or CTXs.  [facility] survey completed.    +FM?:  yes

## 2024-06-11 NOTE — PROGRESS NOTES
Patient presents at 29 weeks 3 days gestation for routine prenatal exam. The patient denies decreased fetal movement, loss of fluid, vaginal bleeding, contractions or pain.

## 2024-06-18 ENCOUNTER — CLINICAL SUPPORT (OUTPATIENT)
Dept: POSTPARTUM | Facility: CLINIC | Age: 39
End: 2024-06-18

## 2024-06-18 DIAGNOSIS — Z32.2 ENCOUNTER FOR CHILDBIRTH INSTRUCTION: Primary | ICD-10-CM

## 2024-06-20 ENCOUNTER — TELEPHONE (OUTPATIENT)
Dept: OBGYN CLINIC | Facility: CLINIC | Age: 39
End: 2024-06-20

## 2024-06-20 NOTE — TELEPHONE ENCOUNTER
Called the patient and left a voice message on her machine to return the call for a third trimester call also sent a message via my chart   .Hi Tina,    It's time for our 3rd trimester call! I will be reaching out next week to complete our check-in. My number may come up as spam and/or healthcare depending on your phone carrier settings.     If you have not had a chance to look at the yellow folder that was given to you at your 28 week appointment, now is a good time. I will ask if you have any questions regarding the paperwork that needed to be filled out and brought back to the office.     Check out “Baby & Me Hospital Readiness Class” from [facility] on [employer]. The video is available for your viewing pleasure at [email or web address]     I have attached a Depression Questionnaire if you could please fill this as soon as you can prior to our phone call, I'd greatly appreciate it!     Please let me know if it is easier to have this check-in via Join The Wellness Team, I will gladly message you my questions if this works better for you.     Thank you,   RAYMOND Garcia   OB Navigator   [facility]

## 2024-06-27 ENCOUNTER — ROUTINE PRENATAL (OUTPATIENT)
Dept: OBGYN CLINIC | Facility: CLINIC | Age: 39
End: 2024-06-27
Payer: COMMERCIAL

## 2024-06-27 VITALS
BODY MASS INDEX: 30.86 KG/M2 | WEIGHT: 196.6 LBS | HEIGHT: 67 IN | DIASTOLIC BLOOD PRESSURE: 60 MMHG | SYSTOLIC BLOOD PRESSURE: 122 MMHG

## 2024-06-27 DIAGNOSIS — Z3A.31 31 WEEKS GESTATION OF PREGNANCY: Primary | ICD-10-CM

## 2024-06-27 LAB
SL AMB  POCT GLUCOSE, UA: NORMAL
SL AMB LEUKOCYTE ESTERASE,UA: NORMAL
SL AMB POCT CLARITY,UA: CLEAR
SL AMB POCT COLOR,UA: NORMAL
SL AMB POCT NITRITE,UA: NORMAL
SL AMB POCT URINE PROTEIN: NORMAL

## 2024-06-27 PROCEDURE — PNV: Performed by: PHYSICIAN ASSISTANT

## 2024-06-27 PROCEDURE — 81002 URINALYSIS NONAUTO W/O SCOPE: CPT | Performed by: PHYSICIAN ASSISTANT

## 2024-06-27 NOTE — PROGRESS NOTES
Assessment     Pregnancy 31 and 5/7 weeks     Plan    Routine OB visit third trimester doing well overall.  She has noted some slow changes in symptoms throughout her pregnancy such as feeling more winded with exertion, generalized symmetrical bilateral foot and ankle swelling that improves in the a.m., occasional headache as well as fatigue.  After discussing with patient the symptoms does not seem patient needs further evaluation and likely secondary to pregnancy itself.  Management of symptoms were reviewed with patient.    Patient has completed her 28-week labs which overall have been normal.  Mild anemia I recommend continuing oral iron as she has been doing.    Blood pressure today 122/60,  with good fetal movement  AB+ blood type  Urine dip today was reported as normal.    Patient reminded of MFM follow-up scheduled   Stressed importance of staying well-hydrated drinking plenty water, well-balanced diet, exercise as tolerated  Continue PNV, ASA, magnesium daily  Stressed kick counts    Will discuss plan for  with Dr. Francois at upcoming visits.  RTO 2 weeks    Chief Complaint   Patient presents with    Routine Prenatal Visit     Pt has been experiencing extreme fatigue, headaches, back pain, SOB/winded, edema at hands and feet as well as cramping in her feet.         April Baron is a 39 y.o. female being seen today for her obstetrical visit. She is at 31w5d gestation.  Patient reports doing well overall but having some symptoms to review today.  She denies symptoms getting worse or severe aside from general fatigue.  She attributes many of the mild symptoms to pregnancy.    Patient reports no bleeding, no contractions, no cramping, and no leaking. Fetal movement: normal.    Patient is taking PNV, ASA, magnesium daily as well as oral iron every other day.    She gets LBP, better with swimming in the pool.  She declines PT.   She does wear belly band.    Mild bilateral LE  "swelling in feet and ankles, worsens throughout the day, improved in AM.   1-2 episodes of mild toe cramp on right side during the day, better with massage.  She does feel a little more winded with exertion. No SOB at rest.  States slowly seems to be more as pregnancy goes on. No sudden changes.   Denies chest pain, palpitation, chest tightness, lightheadedness or syncope.    Occasional HA's depending on weather and extent of computer use while at work.  No migraines, no acute vision change, dizziness or lightheaded or abd pain.   She is taking the iron and magnesium.     Menstrual History:  OB History          1    Para   0    Term   0       0    AB   0    Living   0         SAB   0    IAB   0    Ectopic   0    Multiple   0    Live Births   0           Obstetric Comments   Menarche 14              Menarche age: N/A  Patient's last menstrual period was 2023.       The following portions of the patient's history were reviewed and updated as appropriate: allergies, current medications, past family history, past medical history, past social history, past surgical history, and problem list.    Review of Systems  Pertinent items are noted in HPI.     Objective     /60 (BP Location: Left arm, Patient Position: Sitting, Cuff Size: Adult)   Ht 5' 7\" (1.702 m)   Wt 89.2 kg (196 lb 9.6 oz)   LMP 2023   BMI 30.79 kg/m²   FHT:  138 BPM   Uterine Size: 31 cm and size equals dates   Presentation: unsure              "

## 2024-07-02 ENCOUNTER — ULTRASOUND (OUTPATIENT)
Facility: HOSPITAL | Age: 39
End: 2024-07-02
Payer: COMMERCIAL

## 2024-07-02 VITALS
SYSTOLIC BLOOD PRESSURE: 128 MMHG | HEART RATE: 97 BPM | DIASTOLIC BLOOD PRESSURE: 62 MMHG | HEIGHT: 67 IN | BODY MASS INDEX: 30.98 KG/M2 | WEIGHT: 197.4 LBS

## 2024-07-02 DIAGNOSIS — O09.512 AMA (ADVANCED MATERNAL AGE) PRIMIGRAVIDA 35+, SECOND TRIMESTER: ICD-10-CM

## 2024-07-02 DIAGNOSIS — Z3A.32 32 WEEKS GESTATION OF PREGNANCY: Primary | ICD-10-CM

## 2024-07-02 PROCEDURE — 99213 OFFICE O/P EST LOW 20 MIN: CPT | Performed by: OBSTETRICS & GYNECOLOGY

## 2024-07-02 PROCEDURE — 76816 OB US FOLLOW-UP PER FETUS: CPT | Performed by: OBSTETRICS & GYNECOLOGY

## 2024-07-02 NOTE — PROGRESS NOTES
A fetal ultrasound was completed. See Ob procedures in Epic for an interpretation and recommendations. Do not hesitate to contact us in [facility] if you have questions.    Nathaniel Hu MD, MSCE  Maternal Fetal Medicine

## 2024-07-02 NOTE — LETTER
July 2, 2024     Xiomara Francois MD  [address] [facility]  1st [address]  [place] [address]    Patient: Tian Baron   YOB: 1985   Date of Visit: 7/2/2024       Dear Dr. Francois:    Thank you for referring Tina Baron to me for evaluation. Below are my notes for this consultation.    If you have questions, please do not hesitate to call me. I look forward to following your patient along with you.         Sincerely,        Nathaniel Hu MD        CC: No Recipients    Nathaniel Hu MD  7/2/2024  2:56 PM  Sign when Signing Visit  A fetal ultrasound was completed. See Ob procedures in Epic for an interpretation and recommendations. Do not hesitate to contact us in [facility] if you have questions.    Nathaniel Hu MD, MSCE  Maternal Fetal Medicine

## 2024-07-08 ENCOUNTER — ROUTINE PRENATAL (OUTPATIENT)
Dept: OBGYN CLINIC | Facility: CLINIC | Age: 39
End: 2024-07-08
Payer: COMMERCIAL

## 2024-07-08 VITALS
HEIGHT: 67 IN | DIASTOLIC BLOOD PRESSURE: 68 MMHG | BODY MASS INDEX: 30.89 KG/M2 | SYSTOLIC BLOOD PRESSURE: 120 MMHG | WEIGHT: 196.8 LBS

## 2024-07-08 DIAGNOSIS — Z23 ENCOUNTER FOR IMMUNIZATION: ICD-10-CM

## 2024-07-08 DIAGNOSIS — Z3A.33 33 WEEKS GESTATION OF PREGNANCY: Primary | ICD-10-CM

## 2024-07-08 PROCEDURE — PNV: Performed by: OBSTETRICS & GYNECOLOGY

## 2024-07-08 PROCEDURE — 81002 URINALYSIS NONAUTO W/O SCOPE: CPT | Performed by: OBSTETRICS & GYNECOLOGY

## 2024-07-08 PROCEDURE — 90471 IMMUNIZATION ADMIN: CPT | Performed by: OBSTETRICS & GYNECOLOGY

## 2024-07-08 PROCEDURE — 90715 TDAP VACCINE 7 YRS/> IM: CPT | Performed by: OBSTETRICS & GYNECOLOGY

## 2024-07-08 NOTE — PROGRESS NOTES
Patient presents at 33 weeks 2 days gestation for routine prenatal exam.The patient denies decreased fetal movement, loss of fluid, vaginal bleeding, contractions or pain.

## 2024-07-11 ENCOUNTER — TELEPHONE (OUTPATIENT)
Dept: OBGYN CLINIC | Facility: CLINIC | Age: 39
End: 2024-07-11

## 2024-07-23 ENCOUNTER — ROUTINE PRENATAL (OUTPATIENT)
Dept: OBGYN CLINIC | Facility: CLINIC | Age: 39
End: 2024-07-23

## 2024-07-23 VITALS
BODY MASS INDEX: 31.3 KG/M2 | WEIGHT: 199.4 LBS | DIASTOLIC BLOOD PRESSURE: 66 MMHG | SYSTOLIC BLOOD PRESSURE: 114 MMHG | HEIGHT: 67 IN

## 2024-07-23 DIAGNOSIS — Z3A.35 35 WEEKS GESTATION OF PREGNANCY: Primary | ICD-10-CM

## 2024-07-23 PROCEDURE — PNV: Performed by: OBSTETRICS & GYNECOLOGY

## 2024-07-23 NOTE — PROGRESS NOTES
Patient presents at 35 weeks 3 days gestation for routine prenatal exam.The patient denies decreased fetal movement, loss of fluid, vaginal bleeding, contractions or pain.  Will plan on scheduling a primary elective  on ; we have discussed options, risk and benefits with the patient, 39-year-old  1 para 0.

## 2024-07-30 ENCOUNTER — ROUTINE PRENATAL (OUTPATIENT)
Dept: OBGYN CLINIC | Facility: CLINIC | Age: 39
End: 2024-07-30

## 2024-07-30 VITALS
WEIGHT: 199.6 LBS | HEIGHT: 67 IN | BODY MASS INDEX: 31.33 KG/M2 | DIASTOLIC BLOOD PRESSURE: 74 MMHG | SYSTOLIC BLOOD PRESSURE: 120 MMHG

## 2024-07-30 DIAGNOSIS — Z3A.36 36 WEEKS GESTATION OF PREGNANCY: Primary | ICD-10-CM

## 2024-07-30 PROCEDURE — PNV: Performed by: OBSTETRICS & GYNECOLOGY

## 2024-07-30 NOTE — PROGRESS NOTES
Patient presents at 36 weeks 3 days gestation for routine prenatal exam.The patient denies decreased fetal movement, loss of fluid, vaginal bleeding, contractions or pain.  She should return in 1 week or as needed.  She is scheduled for primary elective  on  at 10 AM.

## 2024-08-06 ENCOUNTER — ROUTINE PRENATAL (OUTPATIENT)
Dept: OBGYN CLINIC | Facility: CLINIC | Age: 39
End: 2024-08-06
Payer: COMMERCIAL

## 2024-08-06 VITALS
SYSTOLIC BLOOD PRESSURE: 118 MMHG | WEIGHT: 199 LBS | BODY MASS INDEX: 31.23 KG/M2 | DIASTOLIC BLOOD PRESSURE: 68 MMHG | HEIGHT: 67 IN

## 2024-08-06 DIAGNOSIS — Z3A.37 37 WEEKS GESTATION OF PREGNANCY: Primary | ICD-10-CM

## 2024-08-06 PROCEDURE — PNV: Performed by: OBSTETRICS & GYNECOLOGY

## 2024-08-06 PROCEDURE — 81002 URINALYSIS NONAUTO W/O SCOPE: CPT | Performed by: OBSTETRICS & GYNECOLOGY

## 2024-08-06 NOTE — PROGRESS NOTES
The patient presents at 37 weeks 3 days gestation for routine prenatal exam.The patient denies decreased fetal movement, loss of fluid, vaginal bleeding, contractions or pain.  Her back in 1 week or as needed   Rifampin Pregnancy And Lactation Text: This medication is Pregnancy Category C and it isn't know if it is safe during pregnancy. It is also excreted in breast milk and should not be used if you are breast feeding.

## 2024-08-12 ENCOUNTER — ROUTINE PRENATAL (OUTPATIENT)
Dept: OBGYN CLINIC | Facility: CLINIC | Age: 39
End: 2024-08-12
Payer: COMMERCIAL

## 2024-08-12 VITALS
WEIGHT: 203.2 LBS | DIASTOLIC BLOOD PRESSURE: 72 MMHG | SYSTOLIC BLOOD PRESSURE: 112 MMHG | HEIGHT: 67 IN | BODY MASS INDEX: 31.89 KG/M2

## 2024-08-12 DIAGNOSIS — Z3A.38 38 WEEKS GESTATION OF PREGNANCY: Primary | ICD-10-CM

## 2024-08-12 PROCEDURE — 81002 URINALYSIS NONAUTO W/O SCOPE: CPT | Performed by: OBSTETRICS & GYNECOLOGY

## 2024-08-12 PROCEDURE — PNV: Performed by: OBSTETRICS & GYNECOLOGY

## 2024-08-12 NOTE — PROGRESS NOTES
Patient presents at 38 weeks 2 days gestation for routine prenatal exam.The patient denies decreased fetal movement, loss of fluid, vaginal bleeding, contractions or pain.  She is scheduled for elective primary  section on 819.

## 2024-08-16 ENCOUNTER — PRE-BIRTH ASSESSMENT (OUTPATIENT)
Dept: LABOR AND DELIVERY | Facility: HOSPITAL | Age: 39
End: 2024-08-16

## 2024-08-16 NOTE — NURSING NOTE
Phone call to patient for pre-operative instructions prior to - message left on voicemail with charge nurse call back number.    Pt was instructed to arrive 2 hours before her scheduled OR time.    Pt instructed to remain NPO after midnight.   *This includes gum, water and hard candy.  *If patient takes AM meds (e.g.hypertensive meds) they may take these meds with a sip of water.    *This should not include medications like prenatal vitamins Aspirin or colace.   *Type 1 diabetics should stay on their normal insulin regime or as ordered by their doctor.  Type 2 on insulin should take 1/2 dose of their overnight insulin or as instructed by their doctor    Pt should brush their teeth as usual.    Pt was instructed to buy and use a pre-surgical wash containing chlorhexidine the night before and the morning of her scheduled  and to wear clean clothing after the wash.  Pt instructed not to shave operative area prior to surgery.    Pt was asked not to wear any jewelry and to leave all of her valuables at home.    Pt was asked to leave all of her larger bags and suitcases in the car to be brought in after she is assigned a post partum room.  Pt should bring in any paperwork she was given in the office.    Pt was informed that she may have 1 support person in the OR/PACU area and of the current visiting policies.  Support person should eat prior to the surgery.

## 2024-08-18 PROBLEM — Z3A.39 39 WEEKS GESTATION OF PREGNANCY: Status: ACTIVE | Noted: 2024-04-16

## 2024-08-18 NOTE — ASSESSMENT & PLAN NOTE
mL Hgb  12.9 -> 11.1   Pain: Tylenol/Motrin Scheduled, PRN   FEN: regular diet   DVT ppx: SCDs, Lovenox 40 mg    Voiding spontaneously, s/p void trial   Encourage ambulation   Encourage breastfeeding

## 2024-08-19 ENCOUNTER — HOSPITAL ENCOUNTER (INPATIENT)
Facility: HOSPITAL | Age: 39
LOS: 3 days | Discharge: HOME | End: 2024-08-22
Attending: OBSTETRICS & GYNECOLOGY | Admitting: OBSTETRICS & GYNECOLOGY
Payer: COMMERCIAL

## 2024-08-19 ENCOUNTER — ANESTHESIA EVENT (INPATIENT)
Dept: LABOR AND DELIVERY | Facility: HOSPITAL | Age: 39
End: 2024-08-19
Payer: COMMERCIAL

## 2024-08-19 ENCOUNTER — ANESTHESIA (INPATIENT)
Dept: LABOR AND DELIVERY | Facility: HOSPITAL | Age: 39
End: 2024-08-19
Payer: COMMERCIAL

## 2024-08-19 DIAGNOSIS — Z41.9 ELECTIVE SURGICAL PROCEDURE: Primary | ICD-10-CM

## 2024-08-19 DIAGNOSIS — Z98.891 HISTORY OF PRIMARY CESAREAN SECTION: ICD-10-CM

## 2024-08-19 DIAGNOSIS — Z3A.39 39 WEEKS GESTATION OF PREGNANCY: ICD-10-CM

## 2024-08-19 LAB
ABO GROUP BLD: NORMAL
BASE EXCESS BLDCOA CALC-SCNC: -3 MMOL/L (ref 3–11)
BASE EXCESS BLDCOV CALC-SCNC: -2.5 MMOL/L (ref 1–9)
BASOPHILS # BLD AUTO: 0.03 THOUSANDS/ÂΜL (ref 0–0.1)
BASOPHILS NFR BLD AUTO: 0 % (ref 0–1)
BLD GP AB SCN SERPL QL: NEGATIVE
EOSINOPHIL # BLD AUTO: 0.03 THOUSAND/ÂΜL (ref 0–0.61)
EOSINOPHIL NFR BLD AUTO: 0 % (ref 0–6)
ERYTHROCYTE [DISTWIDTH] IN BLOOD BY AUTOMATED COUNT: 13.8 % (ref 11.6–15.1)
EXTERNAL GROUP B STREP ANTIGEN: POSITIVE
HCO3 BLDCOA-SCNC: 24.8 MMOL/L (ref 17.3–27.3)
HCO3 BLDCOV-SCNC: 22.9 MMOL/L (ref 12.2–28.6)
HCT VFR BLD AUTO: 38.2 % (ref 34.8–46.1)
HGB BLD-MCNC: 12.9 G/DL (ref 11.5–15.4)
HOLD SPECIMEN: NORMAL
IMM GRANULOCYTES # BLD AUTO: 0.08 THOUSAND/UL (ref 0–0.2)
IMM GRANULOCYTES NFR BLD AUTO: 1 % (ref 0–2)
LYMPHOCYTES # BLD AUTO: 1.42 THOUSANDS/ÂΜL (ref 0.6–4.47)
LYMPHOCYTES NFR BLD AUTO: 20 % (ref 14–44)
MCH RBC QN AUTO: 30.6 PG (ref 26.8–34.3)
MCHC RBC AUTO-ENTMCNC: 33.8 G/DL (ref 31.4–37.4)
MCV RBC AUTO: 91 FL (ref 82–98)
MONOCYTES # BLD AUTO: 0.62 THOUSAND/ÂΜL (ref 0.17–1.22)
MONOCYTES NFR BLD AUTO: 9 % (ref 4–12)
NEUTROPHILS # BLD AUTO: 4.8 THOUSANDS/ÂΜL (ref 1.85–7.62)
NEUTS SEG NFR BLD AUTO: 70 % (ref 43–75)
NRBC BLD AUTO-RTO: 0 /100 WBCS
O2 CT VFR BLDCOA CALC: 7.3 ML/DL
OXYHGB MFR BLDCOA: 37.7 %
OXYHGB MFR BLDCOV: 65.1 %
PCO2 BLDCOA: 56.1 MM[HG] (ref 30–60)
PCO2 BLDCOV: 41.8 MM HG (ref 27–43)
PH BLDCOA: 7.26 [PH] (ref 7.23–7.43)
PH BLDCOV: 7.36 [PH] (ref 7.19–7.49)
PLATELET # BLD AUTO: 207 THOUSANDS/UL (ref 149–390)
PMV BLD AUTO: 10.7 FL (ref 8.9–12.7)
PO2 BLDCOA: 18.8 MM HG (ref 5–25)
PO2 BLDCOV: 27.5 MM HG (ref 15–45)
RBC # BLD AUTO: 4.22 MILLION/UL (ref 3.81–5.12)
RH BLD: POSITIVE
SAO2 % BLDCOV: 11.8 ML/DL
SPECIMEN EXPIRATION DATE: NORMAL
TREPONEMA PALLIDUM IGG+IGM AB [PRESENCE] IN SERUM OR PLASMA BY IMMUNOASSAY: NORMAL
WBC # BLD AUTO: 6.98 THOUSAND/UL (ref 4.31–10.16)

## 2024-08-19 PROCEDURE — 86850 RBC ANTIBODY SCREEN: CPT

## 2024-08-19 PROCEDURE — 86900 BLOOD TYPING SEROLOGIC ABO: CPT

## 2024-08-19 PROCEDURE — 86901 BLOOD TYPING SEROLOGIC RH(D): CPT

## 2024-08-19 PROCEDURE — NC001 PR NO CHARGE: Performed by: OBSTETRICS & GYNECOLOGY

## 2024-08-19 PROCEDURE — 86780 TREPONEMA PALLIDUM: CPT

## 2024-08-19 PROCEDURE — 59510 CESAREAN DELIVERY: CPT | Performed by: OBSTETRICS & GYNECOLOGY

## 2024-08-19 PROCEDURE — 82805 BLOOD GASES W/O2 SATURATION: CPT | Performed by: OBSTETRICS & GYNECOLOGY

## 2024-08-19 PROCEDURE — 85025 COMPLETE CBC W/AUTO DIFF WBC: CPT

## 2024-08-19 RX ORDER — OXYTOCIN/RINGER'S LACTATE 30/500 ML
PLASTIC BAG, INJECTION (ML) INTRAVENOUS CONTINUOUS PRN
Status: DISCONTINUED | OUTPATIENT
Start: 2024-08-19 | End: 2024-08-19

## 2024-08-19 RX ORDER — DEXAMETHASONE SODIUM PHOSPHATE 10 MG/ML
INJECTION, SOLUTION INTRAMUSCULAR; INTRAVENOUS AS NEEDED
Status: DISCONTINUED | OUTPATIENT
Start: 2024-08-19 | End: 2024-08-19

## 2024-08-19 RX ORDER — CEFAZOLIN SODIUM 2 G/50ML
2000 SOLUTION INTRAVENOUS ONCE
Status: COMPLETED | OUTPATIENT
Start: 2024-08-19 | End: 2024-08-19

## 2024-08-19 RX ORDER — BUPIVACAINE HYDROCHLORIDE 7.5 MG/ML
INJECTION, SOLUTION INTRASPINAL AS NEEDED
Status: DISCONTINUED | OUTPATIENT
Start: 2024-08-19 | End: 2024-08-19

## 2024-08-19 RX ORDER — IBUPROFEN 600 MG/1
600 TABLET, FILM COATED ORAL EVERY 6 HOURS
Status: DISCONTINUED | OUTPATIENT
Start: 2024-08-21 | End: 2024-08-22 | Stop reason: HOSPADM

## 2024-08-19 RX ORDER — MORPHINE SULFATE 0.5 MG/ML
INJECTION, SOLUTION EPIDURAL; INTRATHECAL; INTRAVENOUS AS NEEDED
Status: DISCONTINUED | OUTPATIENT
Start: 2024-08-19 | End: 2024-08-19

## 2024-08-19 RX ORDER — SIMETHICONE 80 MG
80 TABLET,CHEWABLE ORAL 4 TIMES DAILY PRN
Status: DISCONTINUED | OUTPATIENT
Start: 2024-08-19 | End: 2024-08-22 | Stop reason: HOSPADM

## 2024-08-19 RX ORDER — BENZOCAINE/MENTHOL 6 MG-10 MG
1 LOZENGE MUCOUS MEMBRANE DAILY PRN
Status: DISCONTINUED | OUTPATIENT
Start: 2024-08-19 | End: 2024-08-22 | Stop reason: HOSPADM

## 2024-08-19 RX ORDER — SODIUM CHLORIDE, SODIUM LACTATE, POTASSIUM CHLORIDE, CALCIUM CHLORIDE 600; 310; 30; 20 MG/100ML; MG/100ML; MG/100ML; MG/100ML
125 INJECTION, SOLUTION INTRAVENOUS CONTINUOUS
Status: DISCONTINUED | OUTPATIENT
Start: 2024-08-19 | End: 2024-08-22 | Stop reason: HOSPADM

## 2024-08-19 RX ORDER — NALOXONE HYDROCHLORIDE 0.4 MG/ML
0.1 INJECTION, SOLUTION INTRAMUSCULAR; INTRAVENOUS; SUBCUTANEOUS
Status: ACTIVE | OUTPATIENT
Start: 2024-08-19 | End: 2024-08-20

## 2024-08-19 RX ORDER — ONDANSETRON 2 MG/ML
INJECTION INTRAMUSCULAR; INTRAVENOUS AS NEEDED
Status: DISCONTINUED | OUTPATIENT
Start: 2024-08-19 | End: 2024-08-19

## 2024-08-19 RX ORDER — KETOROLAC TROMETHAMINE 30 MG/ML
INJECTION, SOLUTION INTRAMUSCULAR; INTRAVENOUS AS NEEDED
Status: DISCONTINUED | OUTPATIENT
Start: 2024-08-19 | End: 2024-08-19

## 2024-08-19 RX ORDER — NALBUPHINE HYDROCHLORIDE 10 MG/ML
5 INJECTION, SOLUTION INTRAMUSCULAR; INTRAVENOUS; SUBCUTANEOUS
Status: ACTIVE | OUTPATIENT
Start: 2024-08-19 | End: 2024-08-20

## 2024-08-19 RX ORDER — DOCUSATE SODIUM 100 MG/1
100 CAPSULE, LIQUID FILLED ORAL 2 TIMES DAILY
Status: DISCONTINUED | OUTPATIENT
Start: 2024-08-19 | End: 2024-08-22 | Stop reason: HOSPADM

## 2024-08-19 RX ORDER — ONDANSETRON 2 MG/ML
4 INJECTION INTRAMUSCULAR; INTRAVENOUS ONCE AS NEEDED
Status: COMPLETED | OUTPATIENT
Start: 2024-08-19 | End: 2024-08-19

## 2024-08-19 RX ORDER — FENTANYL CITRATE/PF 50 MCG/ML
50 SYRINGE (ML) INJECTION
Status: DISCONTINUED | OUTPATIENT
Start: 2024-08-19 | End: 2024-08-22 | Stop reason: HOSPADM

## 2024-08-19 RX ORDER — PROMETHAZINE HYDROCHLORIDE 25 MG/ML
12.5 INJECTION, SOLUTION INTRAMUSCULAR; INTRAVENOUS EVERY 6 HOURS PRN
Status: DISCONTINUED | OUTPATIENT
Start: 2024-08-19 | End: 2024-08-22 | Stop reason: HOSPADM

## 2024-08-19 RX ORDER — ACETAMINOPHEN 325 MG/1
650 TABLET ORAL EVERY 6 HOURS SCHEDULED
Status: DISCONTINUED | OUTPATIENT
Start: 2024-08-19 | End: 2024-08-22 | Stop reason: HOSPADM

## 2024-08-19 RX ORDER — ONDANSETRON 2 MG/ML
4 INJECTION INTRAMUSCULAR; INTRAVENOUS EVERY 8 HOURS PRN
Status: DISCONTINUED | OUTPATIENT
Start: 2024-08-19 | End: 2024-08-19

## 2024-08-19 RX ORDER — OXYCODONE HYDROCHLORIDE 5 MG/1
5 TABLET ORAL EVERY 4 HOURS PRN
Status: DISCONTINUED | OUTPATIENT
Start: 2024-08-19 | End: 2024-08-22 | Stop reason: HOSPADM

## 2024-08-19 RX ORDER — KETOROLAC TROMETHAMINE 30 MG/ML
30 INJECTION, SOLUTION INTRAMUSCULAR; INTRAVENOUS EVERY 6 HOURS
Status: DISCONTINUED | OUTPATIENT
Start: 2024-08-19 | End: 2024-08-19 | Stop reason: SDUPTHER

## 2024-08-19 RX ORDER — CALCIUM CARBONATE 500 MG/1
1000 TABLET, CHEWABLE ORAL DAILY PRN
Status: DISCONTINUED | OUTPATIENT
Start: 2024-08-19 | End: 2024-08-22 | Stop reason: HOSPADM

## 2024-08-19 RX ORDER — HYDROMORPHONE HCL/PF 1 MG/ML
0.5 SYRINGE (ML) INJECTION
Status: DISCONTINUED | OUTPATIENT
Start: 2024-08-19 | End: 2024-08-22 | Stop reason: HOSPADM

## 2024-08-19 RX ORDER — OXYCODONE HYDROCHLORIDE 10 MG/1
10 TABLET ORAL EVERY 4 HOURS PRN
Status: DISCONTINUED | OUTPATIENT
Start: 2024-08-20 | End: 2024-08-22 | Stop reason: HOSPADM

## 2024-08-19 RX ORDER — OXYTOCIN/RINGER'S LACTATE 30/500 ML
62.5 PLASTIC BAG, INJECTION (ML) INTRAVENOUS ONCE
Status: COMPLETED | OUTPATIENT
Start: 2024-08-19 | End: 2024-08-20

## 2024-08-19 RX ORDER — DIPHENHYDRAMINE HCL 25 MG
25 TABLET ORAL EVERY 6 HOURS PRN
Status: DISCONTINUED | OUTPATIENT
Start: 2024-08-19 | End: 2024-08-22 | Stop reason: HOSPADM

## 2024-08-19 RX ORDER — KETOROLAC TROMETHAMINE 30 MG/ML
30 INJECTION, SOLUTION INTRAMUSCULAR; INTRAVENOUS EVERY 6 HOURS SCHEDULED
Status: COMPLETED | OUTPATIENT
Start: 2024-08-19 | End: 2024-08-21

## 2024-08-19 RX ORDER — SODIUM CHLORIDE, SODIUM LACTATE, POTASSIUM CHLORIDE, CALCIUM CHLORIDE 600; 310; 30; 20 MG/100ML; MG/100ML; MG/100ML; MG/100ML
125 INJECTION, SOLUTION INTRAVENOUS CONTINUOUS
Status: DISCONTINUED | OUTPATIENT
Start: 2024-08-19 | End: 2024-08-19

## 2024-08-19 RX ORDER — FENTANYL CITRATE 50 UG/ML
INJECTION, SOLUTION INTRAMUSCULAR; INTRAVENOUS AS NEEDED
Status: DISCONTINUED | OUTPATIENT
Start: 2024-08-19 | End: 2024-08-19

## 2024-08-19 RX ORDER — CITRIC ACID/SODIUM CITRATE 334-500MG
30 SOLUTION, ORAL ORAL 4 TIMES DAILY PRN
Status: DISCONTINUED | OUTPATIENT
Start: 2024-08-19 | End: 2024-08-22 | Stop reason: HOSPADM

## 2024-08-19 RX ORDER — OXYCODONE HYDROCHLORIDE 5 MG/1
5 TABLET ORAL EVERY 4 HOURS PRN
Status: DISCONTINUED | OUTPATIENT
Start: 2024-08-20 | End: 2024-08-22 | Stop reason: HOSPADM

## 2024-08-19 RX ORDER — ONDANSETRON 2 MG/ML
4 INJECTION INTRAMUSCULAR; INTRAVENOUS EVERY 8 HOURS PRN
Status: DISCONTINUED | OUTPATIENT
Start: 2024-08-19 | End: 2024-08-22 | Stop reason: HOSPADM

## 2024-08-19 RX ORDER — METOCLOPRAMIDE HYDROCHLORIDE 5 MG/ML
10 INJECTION INTRAMUSCULAR; INTRAVENOUS ONCE
Status: COMPLETED | OUTPATIENT
Start: 2024-08-19 | End: 2024-08-19

## 2024-08-19 RX ADMIN — CEFAZOLIN SODIUM 2000 MG: 2 SOLUTION INTRAVENOUS at 10:26

## 2024-08-19 RX ADMIN — KETOROLAC TROMETHAMINE 30 MG: 30 INJECTION, SOLUTION INTRAMUSCULAR; INTRAVENOUS at 17:27

## 2024-08-19 RX ADMIN — SODIUM CHLORIDE, SODIUM LACTATE, POTASSIUM CHLORIDE, AND CALCIUM CHLORIDE 125 ML/HR: .6; .31; .03; .02 INJECTION, SOLUTION INTRAVENOUS at 14:31

## 2024-08-19 RX ADMIN — KETOROLAC TROMETHAMINE 30 MG: 30 INJECTION, SOLUTION INTRAMUSCULAR; INTRAVENOUS at 11:00

## 2024-08-19 RX ADMIN — OXYTOCIN 62.5 MILLI-UNITS/MIN: 10 INJECTION INTRAVENOUS at 14:32

## 2024-08-19 RX ADMIN — METOCLOPRAMIDE 10 MG: 5 INJECTION, SOLUTION INTRAMUSCULAR; INTRAVENOUS at 13:24

## 2024-08-19 RX ADMIN — MORPHINE SULFATE 0.15 MG: 0.5 INJECTION, SOLUTION EPIDURAL; INTRATHECAL; INTRAVENOUS at 10:30

## 2024-08-19 RX ADMIN — SODIUM CHLORIDE, SODIUM LACTATE, POTASSIUM CHLORIDE, AND CALCIUM CHLORIDE 125 ML/HR: .6; .31; .03; .02 INJECTION, SOLUTION INTRAVENOUS at 09:12

## 2024-08-19 RX ADMIN — SODIUM CHLORIDE, SODIUM LACTATE, POTASSIUM CHLORIDE, AND CALCIUM CHLORIDE 125 ML/HR: .6; .31; .03; .02 INJECTION, SOLUTION INTRAVENOUS at 19:07

## 2024-08-19 RX ADMIN — DOCUSATE SODIUM 100 MG: 100 CAPSULE, LIQUID FILLED ORAL at 19:10

## 2024-08-19 RX ADMIN — FENTANYL CITRATE 15 MCG: 50 INJECTION INTRAMUSCULAR; INTRAVENOUS at 10:27

## 2024-08-19 RX ADMIN — BUPIVACAINE HYDROCHLORIDE IN DEXTROSE 1.7 ML: 7.5 INJECTION, SOLUTION SUBARACHNOID at 10:27

## 2024-08-19 RX ADMIN — DEXAMETHASONE SODIUM PHOSPHATE 10 MG: 10 INJECTION INTRAMUSCULAR; INTRAVENOUS at 10:48

## 2024-08-19 RX ADMIN — ONDANSETRON 4 MG: 2 INJECTION INTRAMUSCULAR; INTRAVENOUS at 12:51

## 2024-08-19 RX ADMIN — Medication 250 MILLI-UNITS/MIN: at 10:44

## 2024-08-19 RX ADMIN — ONDANSETRON 4 MG: 2 INJECTION INTRAMUSCULAR; INTRAVENOUS at 10:47

## 2024-08-19 RX ADMIN — PHENYLEPHRINE HYDROCHLORIDE 50 MCG/MIN: 50 INJECTION INTRAVENOUS at 10:28

## 2024-08-19 RX ADMIN — ONDANSETRON 4 MG: 2 INJECTION INTRAMUSCULAR; INTRAVENOUS at 19:32

## 2024-08-19 RX ADMIN — FENTANYL CITRATE 50 MCG: 50 INJECTION INTRAMUSCULAR; INTRAVENOUS at 12:54

## 2024-08-19 NOTE — OP NOTE
OPERATIVE REPORT  PATIENT NAME: Tina Baron    :  1985  MRN: [id number]  Pt Location: AN L&D OR ROOM 01    SURGERY DATE: 2024    Surgeons and Role:     * Xiomara Francois MD - Primary     * Nicolette Reynaga MD - Assisting    Preop Diagnosis:  Elective surgical procedure [Z41.9]  Pregnancy at 39 weeks of gestation     Post-Op Diagnosis Codes:     * Elective surgical procedure [Z41.9]  Pregnancy at 39 weeks of gestation     Procedure(s) (LRB):   SECTION () (N/A)    Specimen(s):  ID Type Source Tests Collected by Time Destination   A :  Tissue (Placenta on Hold) OB Only Placenta PLACENTA IN STORAGE Xiomara Francois MD 2024 1045    B :  Cord Blood Cord BLOOD GAS, VENOUS, CORD, BLOOD GAS, ARTERIAL, CORD Xiomara Francois MD 2024 1045        Surgical QBL:  Surgical QBL (mL): 339 mL      Drains:  Maki catheter: 50 cc     Anesthesia Type:   Spinal     Operative Indications:  Elective surgical procedure [Z41.9]  Pregnancy at 39 weeks of gestation      Momo Group Classification System:  No Multiple pregnancy, No Transverse or oblique lie, No Breech lie, Gestational age is > or =37 weeks, Nulliparous, Prelabor CD is MOMO GROUP 2b    Complications:   None apparent     Operative Findings:  1. Viable male  at 1044 with APGARs of 9 and 9 at 1 and 5 minutes. Fetus weighted 8lb 13.6oz.  2. Normal intact placenta with centrally inserted 3VC.  3. Normal uterus, bilateral tubes and ovaries.    Umbilical Cord Venous Blood Gas:  Results from last 7 days   Lab Units 24  1045   PH COV  7.357   PCO2 COV mm HG 41.8   HCO3 COV mmol/L 22.9   BASE EXC COV mmol/L -2.5*   O2 CT CD VB mL/dL 11.8   O2 HGB, VENOUS CORD % 65.1     Umbilical Cord Arterial Blood Gas:  Results from last 7 days   Lab Units 24  1045   PH COA  7.264   PCO2 COA  56.1   PO2 COA mm HG 18.8   HCO3 COA mmol/L 24.8   BASE EXC COA mmol/L -3.0*   O2 CONTENT CORD ART ml/dl 7.3   O2 HGB, ARTERIAL CORD % 37.7      Procedure and Technique:  The patient was taken to the operating room. Spinal anesthesia was adequately established and 2 grams of ancef was given for preoperative prophylaxis. The patient was then placed in the dorsal supine position with a left tilt of the hips. The patient was then prepped with betadine for vaginal prep and chloraprep for abdominal prep and draped in the usual sterile fashion for a Pfannenstiel skin incision.      A time out was performed to confirm correct patient and correct procedure.     A Pfannenstiel incision was made and carried down through the underlying subcutaneous tissue to the fascia using a scalpel. Rectus fascia was then incised. We then proceeded in Sacha-Smith fashion. All anatomic layers were well-demarcated. The rectus muscles were  and the peritoneum was identified, entered, and extended longitudinally with blunt dissection.     The bladder blade was inserted and a transverse incision was made in the lower uterine segment using a new surgical blade. The uterine incision was extended cephalad and caudal using blunt dissection. The amniotic sac was entered and the amniotic fluid was noted to be clear .    The surgeon's hand was placed into the uterine cavity. The fetal head was identified and elevated through the uterine incision with the assistance of fundal pressure. With gentle traction, the shoulder was delivered, followed by the rest of the fetal body. There was no nuchal cord noted. On delivery the cord was doubly clamped and cut after delayed cord clamping. The infant was then passed off the table to the awaiting  staff. The  was noted to cry spontaneously and moved all extremities. Venous and arterial blood gas, cord blood, and portion of cord was obtained for analysis and routine blood testing. The placenta delivery was then sent to storage. Placenta was noted to be intact with a centrally inserted three-vessel cord.  Oxytocin was administered  by IV infusion to enhance uterine contraction. The uterus was exteriorized and cleared of all clots and remaining products of conception.      The uterine incision was re approximated using a 0-vicryl in a running locked fashion. A second horizontal imbricating stitch with 0-vicryl was applied. The uterine incision was examined and noted to be hemostatic. The posterior cul-de-sac was cleared of all clots and products of conception. The uterus was replaced into the abdomen and the pericolic gutters were cleared of all clots.  The uterine incision was once again reexamined and noted to be hemostatic. The fascia was re approximated using 0-vicryl in a running nonlocked fashion. The subcutaneous tissue was irrigated and cleared of all clots and debris. Good hemostasis was noted. The subcutaneous tissue was reapproximated with 2-0 plain in an interrupted fashion. The skin incision was closed using 4-0 monocryl and covered with exofin skin adhesive. Good hemostasis was noted. Patient tolerated the procedure well. All needle, sponge, and instrument counts were noted to be correct x 2 at the end of the procedure.  Patient was transferred to the recovery room in stable condition. Dr. Francois was present for the procedure.      Patient Disposition:  PACU         SIGNATURE: Nicolette Reynaga MD  DATE: August 19, 2024  TIME: 11:22 AM

## 2024-08-19 NOTE — PLAN OF CARE
Problem: POSTPARTUM  Goal: Experiences normal postpartu  Problem: BIRTH - VAGINAL/ SECTION  Goal: Fetal and maternal status remain reassuring during the birth process  Description: INTERVENTIONS:  - Monitor vital signs  - Monitor fetal heart rate  - Monitor uterine activity  - Monitor labor progression (vaginal delivery)  - DVT prophylaxis  - Antibiotic prophylaxis  Outcome: Completed  Goal: Emotionally satisfying birthing experience for mother/fetus  Description: Interventions:  - Assess, plan, implement and evaluate the nursing care given to the patient in labor  - Advocate the philosophy that each childbirth experience is a unique experience and support the family's chosen level of involvement and control during the labor process   - Actively participate in both the patient's and family's teaching of the birth process  - Consider cultural, Islam and age-specific factors and plan care for the patient in labor  Outcome: Completed   m course  Description: INTERVENTIONS:  - Monitor maternal vital signs  - Assess uterine involution and lochia  Outcome: Progressing  Goal: Appropriate maternal -  bonding  Description: INTERVENTIONS:  - Identify family support  - Assess for appropriate maternal/infant bonding   -Encourage maternal/infant bonding opportunities  - Referral to  or  as needed  Outcome: Progressing  Goal: Establishment of infant feeding pattern  Description: INTERVENTIONS:  - Assess breast/bottle feeding  - Refer to lactation as needed  Outcome: Progressing  Goal: Incision(s), wounds(s) or drain site(s) healing without S/S of infection  Description: INTERVENTIONS  - Assess and document dressing, incision, wound bed, drain sites and surrounding tissue  - Provide patient and family education  Outcome: Progressing     Problem: PAIN - ADULT  Goal: Verbalizes/displays adequate comfort level or baseline comfort level  Description: Interventions:  - Encourage patient to  monitor pain and request assistance  - Assess pain using appropriate pain scale  - Administer analgesics based on type and severity of pain and evaluate response  - Implement non-pharmacological measures as appropriate and evaluate response  - Consider cultural and social influences on pain and pain management  - Notify physician/advanced practitioner if interventions unsuccessful or patient reports new pain  Outcome: Progressing     Problem: INFECTION - ADULT  Goal: Absence or prevention of progression during hospitalization  Description: INTERVENTIONS:  - Assess and monitor for signs and symptoms of infection  - Monitor lab/diagnostic results  - Monitor all insertion sites, i.e. indwelling lines, tubes, and drains  - Monitor endotracheal if appropriate and nasal secretions for changes in amount and color  - [place] appropriate cooling/warming therapies per order  - Administer medications as ordered  - Instruct and encourage patient and family to use good hand hygiene technique  - Identify and instruct in appropriate isolation precautions for identified infection/condition  Outcome: Progressing  Goal: Absence of fever/infection during neutropenic period  Description: INTERVENTIONS:  - Monitor WBC    Outcome: Progressing     Problem: SAFETY ADULT  Goal: Patient will remain free of falls  Description: INTERVENTIONS:  - Educate patient/family on patient safety including physical limitations  - Instruct patient to call for assistance with activity   - Consult OT/PT to assist with strengthening/mobility   - Keep Call bell within reach  - Keep bed low and locked with side rails adjusted as appropriate  - Keep care items and personal belongings within reach  - Initiate and maintain comfort rounds  Outcome: Progressing  Goal: Maintain or return to baseline ADL function  Description: INTERVENTIONS:  -  Assess patient's ability to carry out ADLs; assess patient's baseline for ADL function and identify physical deficits  which impact ability to perform ADLs (bathing, care of mouth/teeth, toileting, grooming, dressing, etc.)  - Assess/evaluate cause of self-care deficits   - Assess range of motion  - Assess patient's mobility; develop plan if impaired  - Assess patient's need for assistive devices and provide as appropriate  - Encourage maximum independence but intervene and supervise when necessary  - Involve family in performance of ADLs  - Assess for home care needs following discharge   - Consider OT consult to assist with ADL evaluation and planning for discharge  - Provide patient education as appropriate  Outcome: Progressing  Goal: Maintains/Returns to pre admission functional level  Description: INTERVENTIONS:  - Perform AM-PAC 6 Click Basic Mobility/ Daily Activity assessment daily.  - Set and communicate daily mobility goal to care team and patient/family/caregiver.   - Collaborate with rehabilitation services on mobility goals if consulted  - Out of bed for toileting  - Record patient progress and toleration of activity level   Outcome: Progressing     Problem: Knowledge Deficit  Goal: Patient/family/caregiver demonstrates understanding of disease process, treatment plan, medications, and discharge instructions  Description: Complete learning assessment and assess knowledge base.  Interventions:  - Provide teaching at level of understanding  - Provide teaching via preferred learning methods  Outcome: Progressing     Problem: DISCHARGE PLANNING  Goal: Discharge to home or other facility with appropriate resources  Description: INTERVENTIONS:  - Identify barriers to discharge w/patient and caregiver  - Arrange for needed discharge resources and transportation as appropriate  - Identify discharge learning needs (meds, wound care, etc.)  - Arrange for interpretive services to assist at discharge as needed  - Refer to Case Management Department for coordinating discharge planning if the patient needs post-hospital services  based on physician/advanced practitioner order or complex needs related to functional status, cognitive ability, or social support system  Outcome: Progressing

## 2024-08-19 NOTE — ANESTHESIA PROCEDURE NOTES
Spinal Block    Patient location during procedure: OR  Start time: 8/19/2024 10:27 AM  Reason for block: procedure for pain and at surgeon's request  Staffing  Performed by: Heather Johnson CRNA  Authorized by: Alyx Reveles MD    Preanesthetic Checklist  Completed: patient identified, IV checked, site marked, risks and benefits discussed, surgical consent, monitors and equipment checked, pre-op evaluation and timeout performed  Spinal Block  Patient position: sitting  Prep: ChloraPrep and site prepped and draped  Patient monitoring: frequent blood pressure checks, continuous pulse ox and heart rate  Approach: midline  Location: L3-4  Needle  Needle type: Pencan   Needle gauge: 24 G  Needle length: 4 in  Assessment  Sensory level: T4  Injection Assessment:  negative aspiration for heme, no paresthesia on injection and positive aspiration for clear CSF.  Post-procedure:  site cleaned

## 2024-08-19 NOTE — DISCHARGE SUMMARY
Discharge Summary - Tina Baron 39 y.o. female MRN: [id number]    Unit/Bed#: LD PACU-01 Encounter: [id number]    Admission Date: 2024     Discharge Date: 2024    Admitting Attending: Xiomara Francois MD   Delivering Attending: Xiomara Francois MD   Discharge Attending: Bisi Downey MD     Diagnosis:  Pregnancy at 39 weeks of gestation   Advanced Maternal Age   Elective Primary  Sectio n    Procedures: Primary low transverse  section     Complications: none apparent     Pt is a 38yo  who was admitted for 1LTCS at 39w2d. She underwent an uncomplicated  delivery.  She delivered a viable male  at 1044 on 24. Weight was 8lbs 13.6oz (4015g) with APGARs of 9 and 9 at 1 and 5 minutes. Her preoperative Hb was 12.9. Her postoperative Hb was 11.1.  Her postoperative course was uncomplicated.     Condition at discharge: good     On day of discharge pain was well controlled, patient was tolerating PO, passing flatus. She was discharged with standard post partum/ post operative instructions to follow up with her physician in 1 week for an incision check and in 3-6 weeks for a postpartum appointment.     Discharge instructions/Information to patient and family:   -Do not place anything (no partner, tampons or douche) in your vagina for 6 weeks.  -You may walk for exercise for the first 6 weeks then gradually return to your usual activities.   -Please do not drive for 1 week if you have no stitches and for 2 weeks if you have stitches or underwent a  delivery.    -You may take baths or shower per your preference.   -Please look at your bust (breasts) in the mirror daily and call for redness or tenderness or increased warmth.   -Please call us for temperature > 100.4*F or 38* C, worsening pain or a foul discharge.      Discharge Medications:   Prenatal vitamin daily for 6 months or the duration of nursing whichever is longer.  Motrin 600 mg orally every 6 hours  as needed for pain  Tylenol (over the counter) per bottle directions as needed for pain: do NOT use with percocet  Hydrocortisone cream 1% (over the counter) applied 1-2x daily to hemorrhoids as needed  Percocet as needed    Provisions for Follow-Up Care:  Follow up with your doctor in 3 weeks for postpartum visit.     Planned Readmission: No    Nicolette Reynaga MD  Obstetrics & Gynecology PGY-4  8/22/2024  1:54 PM

## 2024-08-19 NOTE — H&P
H & P- Obstetrics   Tina Baron 39 y.o. female MRN: [id number]  Unit/Bed#: LD PACU-01 Encounter: [id number]    Assessment: 39 y.o.  at 39w2d admitted for 1LTCS.      Plan:   Elective surgical procedure  Assessment & Plan  Admit for 1LTCS    * 39 weeks gestation of pregnancy  Assessment & Plan  Request for Elective Primary Low Transverse C section  Anesthesia consult for regional anesthesia  Ancef 2g IV for surgical prophylaxis  FU CBC, Syphilis screening, Blood Type & Screen  FEN: NPO   Rh positive - postpartum rhogam not needed             Discussed case and plan w/ Dr. Francois       Chief Complaint: I am here for my csection    HPI: Tina Baron is a 39 y.o.  with an CHAKA of 2024, by Last Menstrual Period at 39w2d who is being admitted for 1LTCS. She denies having uterine contractions, has no LOF, and reports no VB. She states she has felt good FM.    Patient Active Problem List   Diagnosis    AMA (advanced maternal age) primigravida 35+, second trimester    39 weeks gestation of pregnancy    Elective surgical procedure       Baby complications/comments: none    Review of Systems   All other systems reviewed and are negative.      OB Hx:  OB History    Para Term  AB Living   1 0 0 0 0 0   SAB IAB Ectopic Multiple Live Births   0 0 0 0 0      # Outcome Date GA Lbr David/2nd Weight Sex Type Anes PTL Lv   1 Current               Obstetric Comments   Menarche 14       Past Medical Hx:  Past Medical History:   Diagnosis Date    No pertinent past medical history     Varicella     As a child       Past Surgical hx:  Past Surgical History:   Procedure Laterality Date    NO PAST SURGERIES      OTHER SURGICAL HISTORY      Dog Bite on head         No Known Allergies      Medications Prior to Admission:     magnesium 30 MG tablet    Prenatal Vit-Iron Carbonyl-FA (prenatal multivitamin) TABS    aspirin 81 mg chewable tablet    Objective:  Temp:  [97.5 °F (36.4 °C)] 97.5 °F (36.4 °C)  HR:   [97] 97  Resp:  [18] 18  BP: (117)/(73) 117/73  Body mass index is 31.79 kg/m².     Physical Exam:  Physical Exam  Constitutional:       General: She is not in acute distress.     Appearance: Normal appearance.   HENT:      Head: Normocephalic and atraumatic.   Cardiovascular:      Rate and Rhythm: Normal rate.   Pulmonary:      Effort: Pulmonary effort is normal.   Abdominal:      Palpations: Abdomen is soft.      Tenderness: There is no abdominal tenderness.      Comments: Gravid   Neurological:      General: No focal deficit present.      Mental Status: She is alert.   Skin:     General: Skin is warm and dry.   Psychiatric:         Mood and Affect: Mood normal.            FHT:  Baseline 135 bpm   Variability: Moderate   Accelerations: Present   Decelerations: none     TOCO:   Irritability noted    Lab Results   Component Value Date    WBC 6.98 08/19/2024    HGB 12.9 08/19/2024    HCT 38.2 08/19/2024     08/19/2024     Lab Results   Component Value Date    K 3.7 03/01/2024     03/01/2024    CO2 23 03/01/2024    BUN 4 (L) 03/01/2024    CREATININE 0.47 (L) 03/01/2024    AST 24 03/01/2024    ALT 58 (H) 03/01/2024     Prenatal Labs: Reviewed      Blood type: AB Positive  Antibody: negative  GBS: positive  HIV: non-reactive  Rubella: immune  Syphilis IgM/IgG: non-reactive  HBsAg: non-reactive  HCAb: non-reactive  Chlamydia: negative  Gonorrhea: negative  Diabetes 1 hour screen: 120  3 hour glucose: not indicated  Platelets: 251k on 6/7/24  Hgb: 10.8 g/dL on 6/7/24  >2 Midnights  INPATIENT     Signature/Title: Nicolette Reynaga MD  Date: 8/19/2024  Time: 9:26 AM

## 2024-08-19 NOTE — LACTATION NOTE
This note was copied from a baby's chart.  CONSULT - LACTATION  Baby Boy (Polo Baron 0 days male MRN: [id number]    [facility] NURSERY Room / Bed: (N)/(N) Encounter: [id number]    Maternal Information     MOTHER:  Tina Baron  Maternal Age: 39 y.o.  OB History: # 1 - Date: 24, Sex: Male, Weight: 4015 g (8 lb 13.6 oz), GA: 39w2d, Type: , Low Transverse, Apgar1: 9, Apgar5: 9, Living: Living, Birth Comments: None   Previouse breast reduction surgery? No    Lactation history:   Has patient previously breast fed: No   How long had patient previously breast fed:     Previous breast feeding complications:       Past Surgical History:   Procedure Laterality Date    NO PAST SURGERIES      OTHER SURGICAL HISTORY      Dog Bite on head       Birth information:  YOB: 2024   Time of birth: 10:44 AM   Sex: male   Delivery type: , Low Transverse   Birth Weight: 4015 g (8 lb 13.6 oz)   Percent of Weight Change: 0%     Gestational Age: 39w2d   [unfilled]    Assessment     Breast and nipple assessment:  triangular breasts with small areolas. Bilateral moles on breasts.     [place] Assessment: sleepy    Feeding assessment: feeding well  LATCH:  Latch: Grasps breast, tongue down, lips flanged, rhythmic sucking   Audible Swallowing: Spontaneous and intermittent (24 hours old)   Type of Nipple: Everted (After stimulation)   Comfort (Breast/Nipple): Soft/non-tender   Hold (Positioning): Full assist, staff holds infant at breast   LATCH Score: 8           24 1500   Lactation Consultation   Reason for Consult 20;20 min   Lactation Consultant Total Time 40   Maternal Information   Has mother  before? No   Infant to breast within first hour of birth? Yes   LATCH Documentation   Latch 2   Audible Swallowing 2   Type of Nipple 2   Comfort (Breast/Nipple) 2   Hold (Positioning) 0   LATCH Score 8   Having latch problems? No    Position(s) Used Cross Cradle   Breasts/Nipples   Left Breast Soft   Right Breast Soft   Left Nipple Everted  (small areolas)   Right Nipple Everted  (small areolas)   Intervention Hand expression   Breastfeeding Progress Not yet established   Breast Pump   Pump 3  (has pump through insurance)   Patient Follow-Up   Lactation Consult Status 2   Follow-Up Type Inpatient;Call as needed   Other OB Lactation Documentation    Additional Problem Noted Mom called out for help in latching baby to breast. demonstration with teach back of hand expression effective for drops. Repositioned mom and baby in cross cradle for a deep latch on left breast. Baby actively sucking with swallows. Mom brought baby up S2S and transitioned to right breast. Baby latched with assistance of LC. Educ on babies bellies and milk amounts. Enc lots of S2S. Enc to call for further assistance.   (RSB and DC booklets reviewed)     Feeding recommendations:  breast feed on demand    Information on hand expression given. Discussed benefits of knowing how to manually express breast including stimulating milk supply, softening nipple for latch and evacuating breast in the event of engorgement.    Provided demonstration, education and support of deep latch to breast by placing the nipple to the nose, dragging down to chin to achieve a wide latch. Bring baby to the breast, not breast to baby. Move your shoulders down and away from your ears. Look for ear, shoulder, hip alignment. Baby's upper and lower lip should be flanged on the breast.    Met with mother. Provided mother with Ready, Set, Baby booklet which contained information on:  Hand expression with access to QR codes to review hand expression.  Positioning and latch reviewed as well as showing images of other feeding positions.  Discussed the properties of a good latch in any position.   Feeding on cue and what that means for recognizing infant's hunger, s/s that baby is getting enough milk and some  s/s that breastfeeding dyad may need further help  Skin to Skin contact an benefits to mom and baby  Avoidance of pacifiers for the first month discussed.   Gave information on common concerns, what to expect the first few weeks after delivery, preparing for other caregivers, and how partners can help. Resources for support also provided.    Met with mother to go over discharge breastfeeding booklet including the feeding log. Emphasized 8 or more (12) feedings in a 24 hour period, what to expect for the number of diapers per day of life and the progression of properties of the  stooling pattern.    List of reasons to call a lactation consultant.  Feeding logs  Feeding cues  Hand expression  Baby's Second day (cluster feeding)  Breastfeeding and Your Lifestyle (Medications, Alcohol, Caffeine, Smoking, Street Drugs, Methadone)  First Two Weeks Survival Guide for Breastfeeding  Breast Changes  Physical Therapy  Storage and Handling of Breast milk  How to Keep Your Breast Pump Kit Clean  The Employed Breastfeeding Mother  Mixed feeding  Bottle feeding like breastfeeding (paced bottle feeding)  astfeeding and your lifestyle, storage and preparation of breast milk, how to keep you breast pump clean, the employed breastfeeding mother and paced bottle feeding handouts.     Booklet included Breastfeeding Resources for after discharge including access to the number for the [facility].    Jo-Ann Allison MA 2024 4:13 PM

## 2024-08-19 NOTE — ANESTHESIA POSTPROCEDURE EVALUATION
Post-Op Assessment Note    CV Status:  Stable  Pain Score: 0 (denies)    Pain management: adequate       Mental Status:  Alert and awake   Hydration Status:  Euvolemic   PONV Controlled:  Controlled   Airway Patency:  Patent     Post Op Vitals Reviewed: Yes    No anethesia notable event occurred.    Staff: CRNA           BP   119/57   Temp   97.8   Pulse  78   Resp   16   SpO2   97% RA

## 2024-08-19 NOTE — ANESTHESIA PREPROCEDURE EVALUATION
Procedure:   SECTION () (Uterus)    Relevant Problems   ANESTHESIA   (-) History of anesthesia complications      CARDIO   (-) HTN (hypertension)      GYN   (+) 39 weeks gestation of pregnancy      HEMATOLOGY   (-) Thrombocytopenia (HCC)      PULMONARY   (-) Asthma        Physical Exam    Airway    Mallampati score: II  TM Distance: >3 FB  Neck ROM: full     Dental       Cardiovascular      Pulmonary      Other Findings  post-pubertal.      Anesthesia Plan  ASA Score- 2     Anesthesia Type- spinal with ASA Monitors.         Additional Monitors:     Airway Plan:            Plan Factors-Exercise tolerance (METS): >4 METS.    Chart reviewed. EKG reviewed.  Existing labs reviewed. Patient summary reviewed.                  Induction-     Postoperative Plan-     Perioperative Resuscitation Plan - Level 1 - Full Code.       Informed Consent- Anesthetic plan and risks discussed with patient.  I personally reviewed this patient with the CRNA. Discussed and agreed on the Anesthesia Plan with the CRNA..

## 2024-08-19 NOTE — PROGRESS NOTES
OBGYN Post-Op Check  Tina Baron 39 y.o. female MRN: [id number]  Unit/Bed#: LD PACU- Encounter: [id number]     S: Tina Baron is a 40 yo who is POD#0 s/p 1LTCS. She reports pain is well controlled. She is tolerating ice chips. She endorses mild nausea but denies any vomiting.     O:   Vitals:    24 1215   BP: 124/58   Pulse: 67   Resp: 18   Temp:    SpO2:      I/O          0701   0700  0701   0700  0701   0700    I.V. (mL/kg)   500 (5.4)    IV Piggyback   50    Total Intake(mL/kg)   550 (6)    Urine (mL/kg/hr)   50    Blood   339    Total Output   389    Net   +161                   PE:  Physical Exam  Constitutional:       General: She is not in acute distress.     Appearance: Normal appearance.   HENT:      Head: Normocephalic and atraumatic.   Cardiovascular:      Rate and Rhythm: Normal rate.   Pulmonary:      Effort: Pulmonary effort is normal.   Abdominal:      Palpations: Abdomen is soft.      Tenderness: There is no abdominal tenderness.      Comments: Fundus firm at 2 below the umbilicus   Pfannenstiel incision C/D/I    Neurological:      General: No focal deficit present.      Mental Status: She is alert.   Skin:     General: Skin is warm.   Psychiatric:         Mood and Affect: Mood normal.            Lab Results   Component Value Date    WBC 6.98 2024    HGB 12.9 2024    HCT 38.2 2024    MCV 91 2024     2024     Lab Results   Component Value Date    CALCIUM 9.0 2024    K 3.7 2024    CO2 23 2024     2024    BUN 4 (L) 2024    CREATININE 0.47 (L) 2024         A/P: 39 y.o. female Day of Surgery s/p Procedure(s) (LRB):   SECTION () (N/A)   mL Hgb 12.9 -> F/u AM CBC   Pain: s/p Duramorph, tylenol/Toradol   FEN: regular diet   DVT ppx: SCDs, Lovenox 40 mg POD#1      Nicolette Reynaga MD  Obstetrics & Gynecology PGY-4  2024  12:41 PM

## 2024-08-20 LAB
BASOPHILS # BLD AUTO: 0.04 THOUSANDS/ÂΜL (ref 0–0.1)
BASOPHILS NFR BLD AUTO: 0 % (ref 0–1)
EOSINOPHIL # BLD AUTO: 0.02 THOUSAND/ÂΜL (ref 0–0.61)
EOSINOPHIL NFR BLD AUTO: 0 % (ref 0–6)
ERYTHROCYTE [DISTWIDTH] IN BLOOD BY AUTOMATED COUNT: 13.7 % (ref 11.6–15.1)
HCT VFR BLD AUTO: 33.9 % (ref 34.8–46.1)
HGB BLD-MCNC: 11.1 G/DL (ref 11.5–15.4)
IMM GRANULOCYTES # BLD AUTO: 0.1 THOUSAND/UL (ref 0–0.2)
IMM GRANULOCYTES NFR BLD AUTO: 1 % (ref 0–2)
LYMPHOCYTES # BLD AUTO: 2.31 THOUSANDS/ÂΜL (ref 0.6–4.47)
LYMPHOCYTES NFR BLD AUTO: 17 % (ref 14–44)
MCH RBC QN AUTO: 30.3 PG (ref 26.8–34.3)
MCHC RBC AUTO-ENTMCNC: 32.7 G/DL (ref 31.4–37.4)
MCV RBC AUTO: 93 FL (ref 82–98)
MONOCYTES # BLD AUTO: 1.37 THOUSAND/ÂΜL (ref 0.17–1.22)
MONOCYTES NFR BLD AUTO: 10 % (ref 4–12)
NEUTROPHILS # BLD AUTO: 9.4 THOUSANDS/ÂΜL (ref 1.85–7.62)
NEUTS SEG NFR BLD AUTO: 72 % (ref 43–75)
NRBC BLD AUTO-RTO: 0 /100 WBCS
PLATELET # BLD AUTO: 200 THOUSANDS/UL (ref 149–390)
PMV BLD AUTO: 10.7 FL (ref 8.9–12.7)
RBC # BLD AUTO: 3.66 MILLION/UL (ref 3.81–5.12)
WBC # BLD AUTO: 13.24 THOUSAND/UL (ref 4.31–10.16)

## 2024-08-20 PROCEDURE — 99024 POSTOP FOLLOW-UP VISIT: CPT | Performed by: OBSTETRICS & GYNECOLOGY

## 2024-08-20 PROCEDURE — 85025 COMPLETE CBC W/AUTO DIFF WBC: CPT

## 2024-08-20 RX ORDER — ENOXAPARIN SODIUM 100 MG/ML
40 INJECTION SUBCUTANEOUS
Status: DISCONTINUED | OUTPATIENT
Start: 2024-08-20 | End: 2024-08-22 | Stop reason: HOSPADM

## 2024-08-20 RX ORDER — POLYETHYLENE GLYCOL 3350 17 G/17G
17 POWDER, FOR SOLUTION ORAL DAILY
Status: DISCONTINUED | OUTPATIENT
Start: 2024-08-20 | End: 2024-08-22 | Stop reason: HOSPADM

## 2024-08-20 RX ADMIN — ENOXAPARIN SODIUM 40 MG: 40 INJECTION SUBCUTANEOUS at 09:58

## 2024-08-20 RX ADMIN — POLYETHYLENE GLYCOL 3350 17 G: 17 POWDER, FOR SOLUTION ORAL at 12:17

## 2024-08-20 RX ADMIN — ACETAMINOPHEN 650 MG: 325 TABLET, FILM COATED ORAL at 00:40

## 2024-08-20 RX ADMIN — ACETAMINOPHEN 650 MG: 325 TABLET, FILM COATED ORAL at 12:19

## 2024-08-20 RX ADMIN — KETOROLAC TROMETHAMINE 30 MG: 30 INJECTION, SOLUTION INTRAMUSCULAR; INTRAVENOUS at 12:18

## 2024-08-20 RX ADMIN — ACETAMINOPHEN 650 MG: 325 TABLET, FILM COATED ORAL at 06:05

## 2024-08-20 RX ADMIN — DOCUSATE SODIUM 100 MG: 100 CAPSULE, LIQUID FILLED ORAL at 18:27

## 2024-08-20 RX ADMIN — KETOROLAC TROMETHAMINE 30 MG: 30 INJECTION, SOLUTION INTRAMUSCULAR; INTRAVENOUS at 00:40

## 2024-08-20 RX ADMIN — KETOROLAC TROMETHAMINE 30 MG: 30 INJECTION, SOLUTION INTRAMUSCULAR; INTRAVENOUS at 06:05

## 2024-08-20 RX ADMIN — ACETAMINOPHEN 650 MG: 325 TABLET, FILM COATED ORAL at 18:27

## 2024-08-20 RX ADMIN — OXYCODONE HYDROCHLORIDE 5 MG: 5 TABLET ORAL at 20:48

## 2024-08-20 RX ADMIN — DOCUSATE SODIUM 100 MG: 100 CAPSULE, LIQUID FILLED ORAL at 09:58

## 2024-08-20 RX ADMIN — KETOROLAC TROMETHAMINE 30 MG: 30 INJECTION, SOLUTION INTRAMUSCULAR; INTRAVENOUS at 18:26

## 2024-08-20 NOTE — PLAN OF CARE

## 2024-08-20 NOTE — PLAN OF CARE

## 2024-08-20 NOTE — PROGRESS NOTES
"Progress Note - OB/GYN  Tina Baron 39 y.o. female MRN: [id number]  Unit/Bed#:  308-01 Encounter: [id number]    Assessment and Plan     Tina Baron is a patient of: Dr. Francois ([facility]). She is POD# 1 s/p Primary  section.  Recovering well and is stable       * History of primary  section  Assessment & Plan   mL Hgb  12.9 -> 11.1   Pain: Tylenol/Toradol, plan to transition to Tylenol/Motrin and Joyce PRN today   FEN: regular diet   DVT ppx: SCDs, Lovenox 40 mg POD#1   Encourage ambulation   Encourage breastfeeding           Disposition    - Anticipate discharge home on POD# 2-4      Subjective/Objective     Chief Complaint: Postpartum State     Subjective:    Tina Baron is POD#1 s/p Primary  section. She reports some tugging sensation surrounding her incision. Patient is currently voiding.  She is ambulating.  Patient is currently passing flatus and has had no bowel movement. She is tolerating PO, and denies nausea or vomitting. Patient denies fever, chills, chest pain, shortness of breath, or calf tenderness. Lochia is normal. She is  Breastfeeding and supplementing with donor breast milk. She is recovering well and is stable.       Vitals:   /63 (BP Location: Left arm)   Pulse 67   Temp 97.8 °F (36.6 °C) (Oral)   Resp 18   Ht 5' 7\" (1.702 m)   Wt 92.1 kg (203 lb)   LMP 2023   SpO2 96%   Breastfeeding Yes   BMI 31.79 kg/m²       Intake/Output Summary (Last 24 hours) at 2024 0639  Last data filed at 2024 0415  Gross per 24 hour   Intake 550 ml   Output 1639 ml   Net -1089 ml       Invasive Devices       Peripheral Intravenous Line  Duration             Peripheral IV 24 Right;Ventral (anterior) Wrist <1 day                    Physical Exam:   GEN: Tina Baron appears well, alert and oriented x 3, pleasant and cooperative   CARDIO: Regular rate  RESP:  normal effort  ABDOMEN: soft, mild tenderness to palpation, " fundus firm at 1 below the umbilicus, pfannenstiel incision C/D/I   EXTREMITIES: non-tender, no erythema       Labs:     Hemoglobin   Date Value Ref Range Status   08/20/2024 11.1 (L) 11.5 - 15.4 g/dL Final   08/19/2024 12.9 11.5 - 15.4 g/dL Final     WBC   Date Value Ref Range Status   08/20/2024 13.24 (H) 4.31 - 10.16 Thousand/uL Final   08/19/2024 6.98 4.31 - 10.16 Thousand/uL Final     Platelets   Date Value Ref Range Status   08/20/2024 200 149 - 390 Thousands/uL Final   08/19/2024 207 149 - 390 Thousands/uL Final     Creatinine   Date Value Ref Range Status   03/01/2024 0.47 (L) 0.60 - 1.30 mg/dL Final     Comment:     Standardized to IDMS reference method     AST   Date Value Ref Range Status   03/01/2024 24 13 - 39 U/L Final     ALT   Date Value Ref Range Status   03/01/2024 58 (H) 7 - 52 U/L Final     Comment:     Specimen collection should occur prior to Sulfasalazine administration due to the potential for falsely depressed results.           Nicolette Reynaga MD  8/20/2024  6:39 AM

## 2024-08-20 NOTE — LACTATION NOTE
This note was copied from a baby's chart.  Follow up Lactation: mom has not fed baby since 4:30 am. Mom fed DBM via a bottle. Baby received circ. Procedure and is sleepy. Baby is s2s with mom.     Ed. On how to est. Milk supply.     Demonstration and teach back of hand expression. Visible transitioning colostrum on nipple face. Demonstration and hands on support of teach back of Football on the right breast and Cross cradle on the left breast.     Discussion of General Lactation Issues: demonstration and encouragement of Deep latch and snug hold at the breast.  Complications during Pregnancy: n/a  Complications during Delivery: choose elective c.s  Elective Induction: Yes   First nursing/attempt < 1 hour after birth: in pACU    Skin to skin following delivery: In PACU    Full term:yes - 39 wk. 2 days     labor:no       Interval Breastfeeding History:    Frequency of breast feeding: long time between feeds  Does mother feel breastfeeding is effective: If no, explain: wants reassurance and support at each feeding  Does infant appear satisfied after nursing:No due to misalignment      Alternative/Artificial Feedings:   Bottle: Yes, with DBM overnight                       Frequency: last feed was 6 Hr between feedings      Mother Assessment:  Breast: round, symmetrical breasts with small, dark areolas and small, round, everted nipples  Nipple Assessment in General: L nipple has bruising on nipple face - R nipple WNL  Mother's Awareness of Feeding Cues                 Recognizes: No                  Verbalizes: No  Support System: Maternal mother  History of Breastfeeding: first child  Changes/Stressors/Violence: wants to excl. Breast feed or feed breast milk  Concerns/Goals: not latching well to the breast; long time between feeds      Infant Assessment:  Behaviors: Sleepy    [place] Latch With Lactation Education/Consult:  Efficiency: Football on the Right and Cross Cradle on the LEft              Lips Flanged:  Yes, with support and alignment from lactation               Depth of latch: deeper with lactation support - enc. Cheeks touching the breast              Audible Swallow: some NNS              Visible Milk: Yes, with HE              Wide Open/ Asymmetrical: No, demonstration and teach back of infant oral massage              Suck Swallow Cycle: Breathing: yes, Coordinated: some  Nipple Assessment after latch: Normal: elongated/eraser, no discoloration and no damage noted.  Latch Problems: alignment and positioning. Mom does not demonstrate a snug hold. Mom is anxious and desires reassruance    Position After Lactation Education/Consult:  Infant's Ergonomics/Body               Body Alignment: Yes, with lactation positioning               Head Supported: enc. Snug hold               Close to Mom's body/ Lifted/ Supported: Yes, with lactation reassurance               Mom's Ergonomics/Body: Yes, with pillows in lower lumbar                           Supported: Yes, enc. Upright position                           Sitting Back: Yes                           Brings Baby to her breast: Yes  Positioning Problems: alignment of the nipple to the nose. Chin into the breast, and how to achieve a deep latch.     Equipment:  H[facility] Grade Pump: set up with multi-user pump last night. Enc. Hand pump prior to latch and after the latch     Manual Pump: Yes, demonstration with teach back - enc. 21 mm flange       Personal Pump            Type: S1             Frequency of Use: n/a    Lanolin: Yes, with 21 mm flanges and after feeding       Syringe Feeding: Yes, any expressed milk     Bottle Feeding with volume feeders: Yes, DBM overnight     Demonstration of Paced Bottle Feeding : No     Handouts:   N/a    Feeding Plan    1. Meet early feeding cues  2. Use manual pump to elongate the nipple prior to the latch. May use lanolin inside flange.  - 21 mm flange  3. Use massage, warmth, hand expression to stimulate breasts  4. Use pillows  to bring baby to the breast (shoulders back, lower back support)  5. Bring baby to breast skin to skin  6. Have baby's chest against mom's torso. Baby's chin should be deeply into the breast, and nose should touch the nipple. This position will  assist with deeper latch  7. Place opposite hand under the breast and grab the breast like a taco. Your thumb should be in front of the baby's nose and behind the areola. Move baby not breast, and bring baby to breast when mouth is wide and deep latch is achieved.  8. Use breast compressions to stimulate suck  9. Once baby unlatches, bring him up to your chest and practice burping techniques.   10. Move baby to the opposite breast and follow steps 1-8 to latch deeply.     Pumping:   - When pumping, begin in stimulation mode (high cycle, low vacuum) until milk begins to express. Change pump to expression mode (low cycle, high vacuum). Use hands on pumping techniques to assist with milk transfer. When milk stops expressing, change back to stimulation mode. When milk begins to flow, change to expression mode. You make cycle pump up to three times in a pumping session.    Spectra Education on turning on the pump, press the 3 wavy lines to place pump on stimulation mode (high cycle, low vacuum) Set vacuum to comfort with light suction. After 3 min, press 3 wavy lines and change setting to Expression mode (low Cycle, High vacuum) Vacuum setting should not pinch, only tug the nipple. Now pump is set. Next time mom pumps, will only need to turn on pump and press 3 wavy line button to change cycle three times in a pumping session.       (Scan QR code for [employer] - positions)   Review [employer] on youtube or scan QR code for [employer] video      Milk Mob        [employer] - positions

## 2024-08-20 NOTE — LACTATION NOTE
"This note was copied from a baby's chart.  Follow up Lactation: mom called for help with latch. Mom is still waiting long time between feeds. Enc. Meeting early feeding cues. Enc. To latch and follow signs of satiation.    Demonstration of hand expression on the Right breast.     [place] Latch After Lactation Education/Consult:  Efficiency: Cross cradle on the left breast              Lips Flanged: Yes              Depth of latch: yes              Audible Swallow: Yes              Visible Milk: Yes, HE              Wide Open/ Asymmetrical: Yes              Suck Swallow Cycle: Breathing: yes, Coordinated: yes  Nipple Assessment after latch: Normal: elongated/eraser, no discoloration and no damage noted.  Latch Problems: enc. And reassurance needed. Baby is more awake and latched deeply. Enc. Snug hold of baby at the breast.    Education on creating a snug hold of your infant to the breast by verifying the infant's cheek is touching the breast, your infant's chin is deep into the breast tissue, your infant's arms are \"hugging\" the breast, and your infant's lips are flanged on the areola. Bring infant to the breast, not your breast to the infant. Latch should feel like a tugging sensation on the nipple.      Position After Lactation Education/Consult:  Infant's Ergonomics/Body               Body Alignment: Yes               Head Supported: Yes, better with pillows to lift the elbows and support baby               Close to Mom's body/ Lifted/ Supported: Yes               Mom's Ergonomics/Body: Yes, with pillows                           Supported: Yes                           Sitting Back: Yes                           Brings Baby to her breast: Yes, with reminders  Positioning Problems: alignment and how to achieve a deep latch    Feeding Plan:     Ed. On cluster feeding.     Reassurance, support and encouragement provided.    Enc. S2s for feeds and watch baby, not clock.        "

## 2024-08-21 PROCEDURE — 99024 POSTOP FOLLOW-UP VISIT: CPT | Performed by: OBSTETRICS & GYNECOLOGY

## 2024-08-21 RX ADMIN — KETOROLAC TROMETHAMINE 30 MG: 30 INJECTION, SOLUTION INTRAMUSCULAR; INTRAVENOUS at 00:18

## 2024-08-21 RX ADMIN — IBUPROFEN 600 MG: 600 TABLET ORAL at 08:03

## 2024-08-21 RX ADMIN — IBUPROFEN 600 MG: 600 TABLET ORAL at 13:57

## 2024-08-21 RX ADMIN — IBUPROFEN 600 MG: 600 TABLET ORAL at 18:06

## 2024-08-21 RX ADMIN — ACETAMINOPHEN 650 MG: 325 TABLET, FILM COATED ORAL at 13:57

## 2024-08-21 RX ADMIN — ACETAMINOPHEN 650 MG: 325 TABLET, FILM COATED ORAL at 00:18

## 2024-08-21 RX ADMIN — POLYETHYLENE GLYCOL 3350 17 G: 17 POWDER, FOR SOLUTION ORAL at 08:03

## 2024-08-21 RX ADMIN — ACETAMINOPHEN 650 MG: 325 TABLET, FILM COATED ORAL at 17:30

## 2024-08-21 RX ADMIN — ACETAMINOPHEN 650 MG: 325 TABLET, FILM COATED ORAL at 08:03

## 2024-08-21 RX ADMIN — ENOXAPARIN SODIUM 40 MG: 40 INJECTION SUBCUTANEOUS at 08:03

## 2024-08-21 RX ADMIN — DOCUSATE SODIUM 100 MG: 100 CAPSULE, LIQUID FILLED ORAL at 08:03

## 2024-08-21 RX ADMIN — DOCUSATE SODIUM 100 MG: 100 CAPSULE, LIQUID FILLED ORAL at 17:30

## 2024-08-21 NOTE — PLAN OF CARE

## 2024-08-21 NOTE — LACTATION NOTE
This note was copied from a baby's chart.  Follow up Lactation: mom has been latching well during cluster feeding. [facility]     Ed. On pacifier use.    Ed. On cluster feeding.    Enc. S2s    Enc. Deep latch as bilateral nipple pain and compressions on nipple face. Wet wound care demonstrated with teach back.    Encouragement and reassurance reviewed.    Discussed 2nd night syndrome and ways to calm infant. Hand out given. Information on hand expression given. Discussed benefits of knowing how to manually express breast including stimulating milk supply, softening nipple for latch and evacuating breast in the event of engorgement.    Education on non-nutritive suck, how the use of pacifier affecting feeds, shallow latch due to pacifier use, and signs of satiation on the breast. Encouraged offering both breasts at least once, up to two times in a feeding session.     Discussed 2nd night syndrome and ways to calm infant. Hand out given. Information on hand expression given. Discussed benefits of knowing how to manually express breast including stimulating milk supply, softening nipple for latch and evacuating breast in the event of engorgement.    Mom is encouraged to place baby skin to skin for feedings. Skin to skin education provided for baby placement on mother's chest, baby only in diaper, blankets below shoulders on baby's back. Skin to skin is encouraged to continue at home for feedings and between feedings.    Demonstrated with teach back breast compressions during a feeding to increase milk transfer and stimulate suckling after a breathing/muscle break.     Mom is encouraged to place baby skin to skin for feedings. Skin to skin education provided for baby placement on mother's chest, baby only in diaper, blankets below shoulders on baby's back. Skin to skin is encouraged to continue at home for feedings and between feedings.

## 2024-08-21 NOTE — PROGRESS NOTES
"Progress Note - OB/GYN  Tina Baron 39 y.o. female MRN: [id number]  Unit/Bed#:  308-01 Encounter: [id number]    Assessment and Plan     Tina Baron is a patient of: Dr. Francois ([facility]). She is POD# 2 s/p Primary  section.  Recovering well and is stable     * History of primary  section  Assessment & Plan   mL Hgb  12.9 -> 11.1   Pain: Tylenol/Motrin Scheduled, PRN   FEN: regular diet   DVT ppx: SCDs, Lovenox 40 mg    Voiding spontaneously, s/p void trial   Encourage ambulation   Encourage breastfeeding           Disposition    - Anticipate discharge home on POD# 3-4      Subjective/Objective     Chief Complaint: Postpartum State     Subjective:    Tina Baron is POD#2 s/p Primary  section. Reports that pain is still present and worse with movement. Patient is currently voiding.  She is ambulating.  Patient is currently passing flatus and has had no bowel movement. She is tolerating PO, and denies nausea or vomitting. Patient denies fever, chills, chest pain, shortness of breath, or calf tenderness. Lochia is normal. She is  Breastfeeding. She is recovering well and is stable.       Vitals:   /74 (BP Location: Left arm)   Pulse 66   Temp 98.2 °F (36.8 °C) (Axillary)   Resp 17   Ht 5' 7\" (1.702 m)   Wt 92.1 kg (203 lb)   LMP 2023   SpO2 99%   Breastfeeding Yes   BMI 31.79 kg/m²       Intake/Output Summary (Last 24 hours) at 2024 0548  Last data filed at 2024 1000  Gross per 24 hour   Intake --   Output 500 ml   Net -500 ml       Invasive Devices       Peripheral Intravenous Line  Duration             Peripheral IV 24 Right;Ventral (anterior) Wrist 1 day                  Physical Exam  Constitutional:       General: She is not in acute distress.     Appearance: Normal appearance.   HENT:      Head: Normocephalic and atraumatic.   Cardiovascular:      Rate and Rhythm: Normal rate.   Pulmonary:      Effort: Pulmonary " effort is normal.   Abdominal:      Palpations: Abdomen is soft.      Tenderness: There is abdominal tenderness. There is no guarding or rebound.      Comments: Fundus firm at 1 below the umbilicus   Pfannenstiel incision C/D/I    Musculoskeletal:      Right lower leg: No tenderness.      Left lower leg: No tenderness.   Neurological:      General: No focal deficit present.      Mental Status: She is alert.   Skin:     General: Skin is warm and dry.   Psychiatric:         Mood and Affect: Mood normal.             Labs:     Hemoglobin   Date Value Ref Range Status   08/20/2024 11.1 (L) 11.5 - 15.4 g/dL Final   08/19/2024 12.9 11.5 - 15.4 g/dL Final     WBC   Date Value Ref Range Status   08/20/2024 13.24 (H) 4.31 - 10.16 Thousand/uL Final   08/19/2024 6.98 4.31 - 10.16 Thousand/uL Final     Platelets   Date Value Ref Range Status   08/20/2024 200 149 - 390 Thousands/uL Final   08/19/2024 207 149 - 390 Thousands/uL Final     Creatinine   Date Value Ref Range Status   03/01/2024 0.47 (L) 0.60 - 1.30 mg/dL Final     Comment:     Standardized to IDMS reference method     AST   Date Value Ref Range Status   03/01/2024 24 13 - 39 U/L Final     ALT   Date Value Ref Range Status   03/01/2024 58 (H) 7 - 52 U/L Final     Comment:     Specimen collection should occur prior to Sulfasalazine administration due to the potential for falsely depressed results.           Nicolette Reynaga MD  8/21/2024  5:48 AM

## 2024-08-22 ENCOUNTER — TELEPHONE (OUTPATIENT)
Dept: OBGYN CLINIC | Facility: CLINIC | Age: 39
End: 2024-08-22

## 2024-08-22 VITALS
RESPIRATION RATE: 16 BRPM | HEIGHT: 67 IN | WEIGHT: 203 LBS | OXYGEN SATURATION: 98 % | SYSTOLIC BLOOD PRESSURE: 119 MMHG | DIASTOLIC BLOOD PRESSURE: 57 MMHG | HEART RATE: 71 BPM | TEMPERATURE: 97.9 F | BODY MASS INDEX: 31.86 KG/M2

## 2024-08-22 PROCEDURE — NC001 PR NO CHARGE: Performed by: STUDENT IN AN ORGANIZED HEALTH CARE EDUCATION/TRAINING PROGRAM

## 2024-08-22 PROCEDURE — 99024 POSTOP FOLLOW-UP VISIT: CPT | Performed by: STUDENT IN AN ORGANIZED HEALTH CARE EDUCATION/TRAINING PROGRAM

## 2024-08-22 RX ORDER — IBUPROFEN 600 MG/1
600 TABLET, FILM COATED ORAL EVERY 6 HOURS
Qty: 30 TABLET | Refills: 0 | Status: SHIPPED | OUTPATIENT
Start: 2024-08-22 | End: 2024-08-22

## 2024-08-22 RX ORDER — ACETAMINOPHEN 325 MG/1
650 TABLET ORAL EVERY 6 HOURS
Qty: 40 TABLET | Refills: 0 | Status: SHIPPED | OUTPATIENT
Start: 2024-08-22 | End: 2024-08-27

## 2024-08-22 RX ORDER — IBUPROFEN 600 MG/1
600 TABLET, FILM COATED ORAL EVERY 6 HOURS
Qty: 30 TABLET | Refills: 0 | Status: SHIPPED | OUTPATIENT
Start: 2024-08-22

## 2024-08-22 RX ORDER — ACETAMINOPHEN 325 MG/1
650 TABLET ORAL EVERY 6 HOURS
Qty: 40 TABLET | Refills: 0 | Status: SHIPPED | OUTPATIENT
Start: 2024-08-22 | End: 2024-08-22

## 2024-08-22 RX ORDER — OXYCODONE HYDROCHLORIDE 5 MG/1
5 TABLET ORAL EVERY 4 HOURS PRN
Qty: 5 TABLET | Refills: 0 | Status: SHIPPED | OUTPATIENT
Start: 2024-08-22 | End: 2024-09-01

## 2024-08-22 RX ADMIN — ENOXAPARIN SODIUM 40 MG: 40 INJECTION SUBCUTANEOUS at 08:21

## 2024-08-22 RX ADMIN — DOCUSATE SODIUM 100 MG: 100 CAPSULE, LIQUID FILLED ORAL at 08:21

## 2024-08-22 RX ADMIN — IBUPROFEN 600 MG: 600 TABLET ORAL at 15:30

## 2024-08-22 RX ADMIN — IBUPROFEN 600 MG: 600 TABLET ORAL at 00:50

## 2024-08-22 RX ADMIN — IBUPROFEN 600 MG: 600 TABLET ORAL at 06:26

## 2024-08-22 RX ADMIN — OXYCODONE HYDROCHLORIDE 5 MG: 5 TABLET ORAL at 13:18

## 2024-08-22 RX ADMIN — ACETAMINOPHEN 650 MG: 325 TABLET, FILM COATED ORAL at 06:26

## 2024-08-22 RX ADMIN — ACETAMINOPHEN 650 MG: 325 TABLET, FILM COATED ORAL at 00:50

## 2024-08-22 NOTE — LACTATION NOTE
"This note was copied from a baby's chart.  Discharge Lactation: Mom is highly anxious about going home. Mom staese baby cluster fed overnight and breasts are bigger and feeling full.       Mom:  Breast: Upon palpation, round, full breasts with taut skin and full glands; darker, small areolas  Nipple Assessment in General: round, rachel nipples with healing wounds from previous shallow latches  Mother's Awareness of Feeding Cues                 Recognizes: Yes, needs reassurance                  Verbalizes: Yes, yes needs reassurance  Support System: maternal mother and FOB  History of Breastfeeding: first child    Demonstration and teach back of warming the breasts with hand expression. Demonstration and some teach back of alignment of nipple to nose. Baby latched in laid-back with cradling. Active, coordinated sucks. Mom denies any pain with latch. Enc. Breast compressions during the feeding.     Ed. On offering each breast up to 2 times in a feeding session to assist with draining the breast.       Ed. On sings and symptoms of breast infections.     Ed. And reassurance on going home - sent EPIC msg to baby and me    Ed. On how to use spectra pump and how to increase milk supply in first month.    D/c reviewed    Nurse on demand: when baby gives hunger cues; when your breasts feel full, or at least every 3 hours during the day and every 5 hours at night counting from the beginning of one feeding to the beginning of the next; which ever comes first. When sucking and swallowing slow, gently compress the breast to restart flow. If active suck-swallow does not restart, gently remove the baby and offer the other breast; offering up to \"four\" breasts per feeding.    Discussed s/s engorgement, blocked milk ducts, and mastitis. Discussed how to remedy at home and when to contact physician. Reviewed engorgement and ways to reduce symptoms. Use a warmth on breasts with massage prior to feeding / pumping. Use massage during " pumping over full ducts. Used cold, compression on breasts after feeding/pumping session. Ideas are rice in a sock. Place one in the freezer for cool and one in the microwave for warmth. Referred to discharge book / engorgement page.    Spectra Education on turning on the pump, press the 3 wavy lines to place pump on stimulation mode (high cycle, low vacuum) Set vacuum to comfort with light suction. After 3 min, press 3 wavy lines and change setting to Expression mode (low Cycle, High vacuum) Vacuum setting should not pinch, only tug the nipple. Now pump is set. Next time mom pumps, will only need to turn on pump and press 3 wavy line button to change cycle three times in a pumping session.     Ed. On 3rd party distributors that offer smaller flanges on-line. ie) [employer]. Names of reputable companies like [employer] and [employer] offer smaller flanges for every double electric pump. Lactation Hub will also provide a set of 12 mm to 19 mm flanges to fit most flanges.Enc. To try smaller flange and place a small amount of lanolin where the tunnel and funnel meet.       Milk Supply:   - Allow for non-nutritive suck at the breast to stimulate supply   - Allow for skin to skin during and after each breastfeeding session   - Use massage, heat, and hand expression prior to feedings to assist with deep latch   - Increase pumping sessions and pump after every feeding    Mom becomes weepy when discussing going home and having a older children and baby in the home.  Encouragement and reassurance provided. Ed. On Baby and Me Behavorial health and support for baby blues and postpartum depression. Reviewed signs and symptoms of baby blues versus postpartum depression with mom and FOB. Provided Baby and Me number.     Provided education on growth spurts, when to introduce bottles; paced bottle feeding, and non-nutritive suck at the breast. Provided education on Signs of satiation. Encouraged to call lactation to observe a latch prior  to discharge for reassurance. Encouraged to call baby and me with any questions and closely monitor output.

## 2024-08-22 NOTE — LACTATION NOTE
"This note was copied from a baby's chart.  Discharge Lactation:     Enc. And reassurance of how to est. Milk supply. Ed. And reassurance on latch and offering both breasts at every feeding.     Ed. On use of spectra pump and 21 mm flanges    Ed. On spectra pump to FOB    Ed. On early feeding cues & s2s.    D/c reviewed with handouts    Handouts:   Mother-led latch,   1st 2 weeks,   Importance of Latch  Latch Checklist  Large Breasts,   Increase Supply,  How to Cycle Hospital Pump,  Hand expression,    Offered baby and me - mom denies at this time    Milk Supply:   - Allow for non-nutritive suck at the breast to stimulate supply   - Allow for skin to skin during and after each breastfeeding session   - Use massage, heat, and hand expression prior to feedings to assist with deep latch   - Increase pumping sessions and pump after every feeding    Nurse on demand: when baby gives hunger cues; when your breasts feel full, or at least every 3 hours during the day and every 5 hours at night counting from the beginning of one feeding to the beginning of the next; which ever comes first. When sucking and swallowing slow, gently compress the breast to restart flow. If active suck-swallow does not restart, gently remove the baby and offer the other breast; offering up to \"four\" breasts per feeding.    Spectra Education on turning on the pump, press the 3 wavy lines to place pump on stimulation mode (high cycle, low vacuum) Set vacuum to comfort with light suction. After 3 min, press 3 wavy lines and change setting to Expression mode (low Cycle, High vacuum) Vacuum setting should not pinch, only tug the nipple. Now pump is set. Next time mom pumps, will only need to turn on pump and press 3 wavy line button to change cycle three times in a pumping session.     Ed. On 3rd party distributors that offer smaller flanges on-line. ie) [employer]. Names of reputable companies like [employer] and [employer] offer smaller flanges for every " double electric pump. Lactation Hub will also provide a set of 12 mm to 19 mm flanges to fit most flanges.Enc. To try smaller flange and place a small amount of lanolin where the tunnel and funnel meet.     Mom is encouraged to place baby skin to skin for feedings. Skin to skin education provided for baby placement on mother's chest, baby only in diaper, blankets below shoulders on baby's back. Skin to skin is encouraged to continue at home for feedings and between feedings.    Provided education on growth spurts, when to introduce bottles; paced bottle feeding, and non-nutritive suck at the breast. Provided education on Signs of satiation. Encouraged to call lactation to observe a latch prior to discharge for reassurance. Encouraged to call baby and me with any questions and closely monitor output.

## 2024-08-22 NOTE — PROGRESS NOTES
"Progress Note - OB/GYN  Tina Baron 39 y.o. female MRN: [id number]  Unit/Bed#:  308-01 Encounter: [id number]    Assessment and Plan     Tina Baron is a patient of: Dr. Francois ([facility]). She is POD# 3 s/p Primary  section.  Recovering well and is stable       Elective surgical procedure  Assessment & Plan  Admit for 1LTCS    * History of primary  section  Assessment & Plan   mL Hgb  12.9 -> 11.1   Pain: Tylenol/Motrin Scheduled, PRN   FEN: regular diet   DVT ppx: SCDs, Lovenox 40 mg    Voiding spontaneously, s/p void trial   Encourage ambulation   Encourage breastfeeding           Disposition    - Anticipate discharge home on POD# 3-4      Subjective/Objective     Chief Complaint: Postpartum State     Subjective:    Tina Baron is POD#3 s/p Primary  section. She has no current complaints.  Pain is well controlled.  Patient is currently voiding.  She is ambulating.  Patient is currently passing flatus. She is tolerating PO, and denies nausea or vomitting. Patient denies fever, chills, chest pain, shortness of breath, or calf tenderness. Lochia is normal. She is  Breastfeeding and Bottle feeding. She is recovering well and is stable.       Vitals:   /66 (BP Location: Left arm)   Pulse 73   Temp 98.4 °F (36.9 °C) (Oral)   Resp 16   Ht 5' 7\" (1.702 m)   Wt 92.1 kg (203 lb)   LMP 2023   SpO2 100%   Breastfeeding Yes   BMI 31.79 kg/m²     No intake or output data in the 24 hours ending 24 0643    Invasive Devices       Peripheral Intravenous Line  Duration             Peripheral IV 24 Right;Ventral (anterior) Wrist 2 days                    Physical Exam:   GEN: Tina Baron appears well, alert and oriented x 3, pleasant and cooperative   CARDIO: Regular rate  RESP:  normal effort   ABDOMEN: soft, no tenderness, no distention, fundus @ 1 below the umbilicus, Incision C/D/I with exofin skin adhesive in place  EXTREMITIES: " non-tender      Labs:     Hemoglobin   Date Value Ref Range Status   08/20/2024 11.1 (L) 11.5 - 15.4 g/dL Final   08/19/2024 12.9 11.5 - 15.4 g/dL Final     WBC   Date Value Ref Range Status   08/20/2024 13.24 (H) 4.31 - 10.16 Thousand/uL Final   08/19/2024 6.98 4.31 - 10.16 Thousand/uL Final     Platelets   Date Value Ref Range Status   08/20/2024 200 149 - 390 Thousands/uL Final   08/19/2024 207 149 - 390 Thousands/uL Final     Creatinine   Date Value Ref Range Status   03/01/2024 0.47 (L) 0.60 - 1.30 mg/dL Final     Comment:     Standardized to IDMS reference method     AST   Date Value Ref Range Status   03/01/2024 24 13 - 39 U/L Final     ALT   Date Value Ref Range Status   03/01/2024 58 (H) 7 - 52 U/L Final     Comment:     Specimen collection should occur prior to Sulfasalazine administration due to the potential for falsely depressed results.           Nicolette Reynaga MD  8/22/2024  6:43 AM

## 2024-08-22 NOTE — PLAN OF CARE
Problem: POSTPARTUM  Goal: Experiences normal postpartum course  Description: INTERVENTIONS:  - Monitor maternal vital signs  - Assess uterine involution and lochia  Outcome: Completed  Goal: Appropriate maternal -  bonding  Description: INTERVENTIONS:  - Identify family support  - Assess for appropriate maternal/infant bonding   -Encourage maternal/infant bonding opportunities  - Referral to  or  as needed  Outcome: Completed  Goal: Establishment of infant feeding pattern  Description: INTERVENTIONS:  - Assess breast/bottle feeding  - Refer to lactation as needed  Outcome: Completed  Goal: Incision(s), wounds(s) or drain site(s) healing without S/S of infection  Description: INTERVENTIONS  - Assess and document dressing, incision, wound bed, drain sites and surrounding tissue  - Provide patient and family education  Outcome: Completed     Problem: PAIN - ADULT  Goal: Verbalizes/displays adequate comfort level or baseline comfort level  Description: Interventions:  - Encourage patient to monitor pain and request assistance  - Assess pain using appropriate pain scale  - Administer analgesics based on type and severity of pain and evaluate response  - Implement non-pharmacological measures as appropriate and evaluate response  - Consider cultural and social influences on pain and pain management  - Notify physician/advanced practitioner if interventions unsuccessful or patient reports new pain  Outcome: Completed     Problem: INFECTION - ADULT  Goal: Absence or prevention of progression during hospitalization  Description: INTERVENTIONS:  - Assess and monitor for signs and symptoms of infection  - Monitor lab/diagnostic results  - Monitor all insertion sites, i.e. indwelling lines, tubes, and drains  - Monitor endotracheal if appropriate and nasal secretions for changes in amount and color  - [place] appropriate cooling/warming therapies per order  - Administer medications as  ordered  - Instruct and encourage patient and family to use good hand hygiene technique  - Identify and instruct in appropriate isolation precautions for identified infection/condition  Outcome: Completed  Goal: Absence of fever/infection during neutropenic period  Description: INTERVENTIONS:  - Monitor WBC    Outcome: Completed     Problem: SAFETY ADULT  Goal: Patient will remain free of falls  Description: INTERVENTIONS:  - Educate patient/family on patient safety including physical limitations  - Instruct patient to call for assistance with activity   - Consult OT/PT to assist with strengthening/mobility   - Keep Call bell within reach  - Keep bed low and locked with side rails adjusted as appropriate  - Keep care items and personal belongings within reach  - Initiate and maintain comfort rounds  - Make Fall Risk Sign visible to staff  - Apply yellow socks and bracelet for high fall risk patients  - Consider moving patient to room near nurses station  Outcome: Completed  Goal: Maintain or return to baseline ADL function  Description: INTERVENTIONS:  -  Assess patient's ability to carry out ADLs; assess patient's baseline for ADL function and identify physical deficits which impact ability to perform ADLs (bathing, care of mouth/teeth, toileting, grooming, dressing, etc.)  - Assess/evaluate cause of self-care deficits   - Assess range of motion  - Assess patient's mobility; develop plan if impaired  - Assess patient's need for assistive devices and provide as appropriate  - Encourage maximum independence but intervene and supervise when necessary  - Involve family in performance of ADLs  - Assess for home care needs following discharge   - Consider OT consult to assist with ADL evaluation and planning for discharge  - Provide patient education as appropriate  Outcome: Completed  Goal: Maintains/Returns to pre admission functional level  Description: INTERVENTIONS:  - Perform AM-PAC 6 Click Basic Mobility/ Daily  Activity assessment daily.  - Set and communicate daily mobility goal to care team and patient/family/caregiver.   - Collaborate with rehabilitation services on mobility goals if consulted  - Out of bed for toileting  - Record patient progress and toleration of activity level   Outcome: Completed     Problem: Knowledge Deficit  Goal: Patient/family/caregiver demonstrates understanding of disease process, treatment plan, medications, and discharge instructions  Description: Complete learning assessment and assess knowledge base.  Interventions:  - Provide teaching at level of understanding  - Provide teaching via preferred learning methods  Outcome: Completed     Problem: DISCHARGE PLANNING  Goal: Discharge to home or other facility with appropriate resources  Description: INTERVENTIONS:  - Identify barriers to discharge w/patient and caregiver  - Arrange for needed discharge resources and transportation as appropriate  - Identify discharge learning needs (meds, wound care, etc.)  - Arrange for interpretive services to assist at discharge as needed  - Refer to Case Management Department for coordinating discharge planning if the patient needs post-hospital services based on physician/advanced practitioner order or complex needs related to functional status, cognitive ability, or social support system  Outcome: Completed

## 2024-08-26 LAB — PLACENTA IN STORAGE: NORMAL

## 2024-09-10 ENCOUNTER — POSTPARTUM VISIT (OUTPATIENT)
Dept: OBGYN CLINIC | Facility: CLINIC | Age: 39
End: 2024-09-10

## 2024-09-10 VITALS
BODY MASS INDEX: 28.16 KG/M2 | DIASTOLIC BLOOD PRESSURE: 70 MMHG | HEIGHT: 67 IN | WEIGHT: 179.4 LBS | SYSTOLIC BLOOD PRESSURE: 122 MMHG

## 2024-09-10 DIAGNOSIS — Z98.891 S/P CESAREAN SECTION: Primary | ICD-10-CM

## 2024-09-10 PROCEDURE — 99024 POSTOP FOLLOW-UP VISIT: CPT | Performed by: OBSTETRICS & GYNECOLOGY

## 2024-09-10 NOTE — PROGRESS NOTES
OB POSTPARTUM VISIT PROGRESS NOTE  Date of Encounter: 9/10/2024    Tina Baron    : 1985  (39 y.o.)  MR: [id number]    Subjective   Tina is in for her postpartum visit. She is now . She delivered by primary  section. She's generally doing well, denies current pain or bleeding issues, and has no significant depression issues. She is breast feeding exclusively. We discussed all appropriate contraceptive options and she chooses None.    Admission on 2024, Discharged on 2024   Component Date Value    ABO Grouping 2024 AB     Rh Factor 2024 Positive     Antibody Screen 2024 Negative     Specimen Expiration Date 202440822     WBC 2024 6.98     RBC 2024 4.22     Hemoglobin 2024 12.9     Hematocrit 2024 38.2     MCV 2024 91     MCH 2024 30.6     MCHC 2024 33.8     RDW 2024 13.8     MPV 2024 10.7     Platelets 2024 207     nRBC 2024 0     Segmented % 2024 70     Immature Grans % 2024 1     Lymphocytes % 2024 20     Monocytes % 2024 9     Eosinophils Relative 2024 0     Basophils Relative 2024 0     Absolute Neutrophils 2024 4.80     Absolute Immature Grans 2024 0.08     Absolute Lymphocytes 2024 1.42     Absolute Monocytes 2024 0.62     Eosinophils Absolute 2024 0.03     Basophils Absolute 2024 0.03     Extra Tube 2024 Hold for add-ons.     Syphilis Total Antibody 2024 Non-reactive     pH, Cord Jair 2024 7.357     pCO2, Cord Jair 2024 41.8     pO2, Cord Jair 2024 27.5     HCO3, Cord Jair 2024 22.9     Base Exc, Cord Jair 2024 -2.5 (L)     O2 Cont, Cord Jair 2024 11.8     O2 HGB,VENOUS CORD 2024 65.1     pH, Cord Art 2024 7.264     pCO2, Cord Art 2024 56.1     pO2, Cord Art 2024 18.8     HCO3, Cord Art 2024 24.8     Base Exc, Cord Art 2024 -3.0  (L)     O2 Content, Cord Art 08/19/2024 7.3     O2 Hgb, Arterial Cord 08/19/2024 37.7     PLACENTA IN STORAGE 08/19/2024 Placenta Discarded     Strep Group B Ag 08/19/2024 Positive (A)     WBC 08/20/2024 13.24 (H)     RBC 08/20/2024 3.66 (L)     Hemoglobin 08/20/2024 11.1 (L)     Hematocrit 08/20/2024 33.9 (L)     MCV 08/20/2024 93     MCH 08/20/2024 30.3     MCHC 08/20/2024 32.7     RDW 08/20/2024 13.7     MPV 08/20/2024 10.7     Platelets 08/20/2024 200     nRBC 08/20/2024 0     Segmented % 08/20/2024 72     Immature Grans % 08/20/2024 1     Lymphocytes % 08/20/2024 17     Monocytes % 08/20/2024 10     Eosinophils Relative 08/20/2024 0     Basophils Relative 08/20/2024 0     Absolute Neutrophils 08/20/2024 9.40 (H)     Absolute Immature Grans 08/20/2024 0.10     Absolute Lymphocytes 08/20/2024 2.31     Absolute Monocytes 08/20/2024 1.37 (H)     Eosinophils Absolute 08/20/2024 0.02     Basophils Absolute 08/20/2024 0.04    Routine Prenatal on 08/12/2024   Component Date Value    LEUKOCYTE ESTERASE,UA 08/12/2024 NEG     NITRITE,UA 08/12/2024 NEG     POCT URINE PROTEIN 08/12/2024 NEG     GLUCOSE, UA 08/12/2024 NEG        Objective   EXAM:  GENERAL: alert, well appearing, and in no distress.  VITALS: Last menstrual period 11/18/2023, currently breastfeeding.  BMI: There is no height or weight on file to calculate BMI.  NECK/THYROID: wnl  HEART: rrr  LUNGS: Clear to auscultation.  BACK: No CVAT  BREASTS: Bilaterally nontender, no masses or nipple discharge.  ABDOMEN:Soft, non-tender, inc healing well  EXTREMITIES: All normal.  PELVIC:   VULVA: Nml EGBUS.   VAGINA: Introitus well healed, slightly tender.   CERVIX: Normal, no discharge.   UTERUS: Anteverted, NSSC, [place], NT.   RIGHT ADNEXUM: Nontender, no mass.   LEFT ADNEXUM: Nontender, no mass.   RECTAL: Deferred.    Assessment & Plan   There are no diagnoses linked to this encounter.    Xiomara Francois MD

## 2024-10-01 ENCOUNTER — POSTPARTUM VISIT (OUTPATIENT)
Dept: OBGYN CLINIC | Facility: CLINIC | Age: 39
End: 2024-10-01

## 2024-10-01 VITALS
HEIGHT: 67 IN | SYSTOLIC BLOOD PRESSURE: 110 MMHG | BODY MASS INDEX: 27.47 KG/M2 | DIASTOLIC BLOOD PRESSURE: 70 MMHG | WEIGHT: 175 LBS

## 2024-10-01 DIAGNOSIS — Z98.891 S/P C-SECTION: Primary | ICD-10-CM

## 2024-10-01 PROCEDURE — 99024 POSTOP FOLLOW-UP VISIT: CPT | Performed by: OBSTETRICS & GYNECOLOGY

## 2024-10-01 NOTE — PROGRESS NOTES
OB POSTPARTUM VISIT PROGRESS NOTE  Date of Encounter: 10/1/2024    Tina Baron    : 1985  (39 y.o.)  MR: [id number]    Subjective   Tina is in for her postpartum visit. She is now . She delivered by primary  section. She's generally doing well, denies current pain or bleeding issues, and has no significant depression issues. She is breast feeding with some supplementation. We discussed all appropriate contraceptive options and she chooses None.    No visits with results within 1 Month(s) from this visit.   Latest known visit with results is:   Admission on 2024, Discharged on 2024   Component Date Value    ABO Grouping 2024 AB     Rh Factor 2024 Positive     Antibody Screen 2024 Negative     Specimen Expiration Date 2024     WBC 2024 6.98     RBC 2024 4.22     Hemoglobin 2024 12.9     Hematocrit 2024 38.2     MCV 2024 91     MCH 2024 30.6     MCHC 2024 33.8     RDW 2024 13.8     MPV 2024 10.7     Platelets 2024 207     nRBC 2024 0     Segmented % 2024 70     Immature Grans % 2024 1     Lymphocytes % 2024 20     Monocytes % 2024 9     Eosinophils Relative 2024 0     Basophils Relative 2024 0     Absolute Neutrophils 2024 4.80     Absolute Immature Grans 2024 0.08     Absolute Lymphocytes 2024 1.42     Absolute Monocytes 2024 0.62     Eosinophils Absolute 2024 0.03     Basophils Absolute 2024 0.03     Extra Tube 2024 Hold for add-ons.     Syphilis Total Antibody 2024 Non-reactive     pH, Cord Jair 2024 7.357     pCO2, Cord Jair 2024 41.8     pO2, Cord Jair 2024 27.5     HCO3, Cord Jair 2024 22.9     Base Exc, Cord Jair 2024 -2.5 (L)     O2 Cont, Cord Jair 2024 11.8     O2 HGB,VENOUS CORD 2024 65.1     pH, Cord Art 2024 7.264     pCO2, Cord Art 2024  "56.1     pO2, Cord Art 08/19/2024 18.8     HCO3, Cord Art 08/19/2024 24.8     Base Exc, Cord Art 08/19/2024 -3.0 (L)     O2 Content, Cord Art 08/19/2024 7.3     O2 Hgb, Arterial Cord 08/19/2024 37.7     PLACENTA IN STORAGE 08/19/2024 Placenta Discarded     Strep Group B Ag 08/19/2024 Positive (A)     WBC 08/20/2024 13.24 (H)     RBC 08/20/2024 3.66 (L)     Hemoglobin 08/20/2024 11.1 (L)     Hematocrit 08/20/2024 33.9 (L)     MCV 08/20/2024 93     MCH 08/20/2024 30.3     MCHC 08/20/2024 32.7     RDW 08/20/2024 13.7     MPV 08/20/2024 10.7     Platelets 08/20/2024 200     nRBC 08/20/2024 0     Segmented % 08/20/2024 72     Immature Grans % 08/20/2024 1     Lymphocytes % 08/20/2024 17     Monocytes % 08/20/2024 10     Eosinophils Relative 08/20/2024 0     Basophils Relative 08/20/2024 0     Absolute Neutrophils 08/20/2024 9.40 (H)     Absolute Immature Grans 08/20/2024 0.10     Absolute Lymphocytes 08/20/2024 2.31     Absolute Monocytes 08/20/2024 1.37 (H)     Eosinophils Absolute 08/20/2024 0.02     Basophils Absolute 08/20/2024 0.04        Objective   EXAM:  GENERAL: alert, well appearing, and in no distress.  VITALS: Blood pressure 110/70, height 5' 7\" (1.702 m), weight 79.4 kg (175 lb), last menstrual period 11/18/2023, currently breastfeeding.  BMI: Body mass index is 27.41 kg/m².  NECK/THYROID: wnl  HEART: rrr  LUNGS: Clear to auscultation.  BACK: No CVAT  BREASTS: Bilaterally nontender, no masses or nipple discharge.  ABDOMEN: Regular  EXTREMITIES: All normal.  PELVIC:   VULVA: Nml EGBUS.   VAGINA: Normal, no discharge. Introitus well healed, slightly tender.   CERVIX: Normal, no discharge.   UTERUS: Anteverted, NSSC, [place], NT.   RIGHT ADNEXUM: Nontender, no mass.   LEFT ADNEXUM: Nontender, no mass.   RECTAL: Deferred.    Assessment & Plan   There are no diagnoses linked to this encounter.    Xiomara Francois MD    "

## 2025-03-31 ENCOUNTER — ANNUAL EXAM (OUTPATIENT)
Dept: OBGYN CLINIC | Facility: CLINIC | Age: 40
End: 2025-03-31
Payer: COMMERCIAL

## 2025-03-31 VITALS
HEIGHT: 67 IN | DIASTOLIC BLOOD PRESSURE: 68 MMHG | SYSTOLIC BLOOD PRESSURE: 122 MMHG | BODY MASS INDEX: 27.34 KG/M2 | WEIGHT: 174.2 LBS

## 2025-03-31 DIAGNOSIS — Z01.419 ENCOUNTER FOR GYNECOLOGICAL EXAMINATION WITHOUT ABNORMAL FINDING: Primary | ICD-10-CM

## 2025-03-31 PROCEDURE — G0476 HPV COMBO ASSAY CA SCREEN: HCPCS | Performed by: OBSTETRICS & GYNECOLOGY

## 2025-03-31 PROCEDURE — G0145 SCR C/V CYTO,THINLAYER,RESCR: HCPCS | Performed by: OBSTETRICS & GYNECOLOGY

## 2025-03-31 PROCEDURE — S0612 ANNUAL GYNECOLOGICAL EXAMINA: HCPCS | Performed by: OBSTETRICS & GYNECOLOGY

## 2025-03-31 NOTE — PROGRESS NOTES
Name: Tina Baron      : 1985      MRN: [id number]  Encounter Provider: Xiomara Francois MD  Encounter Date: 3/31/2025   Encounter department: [facility] [place] [facility]  :  Assessment & Plan  Encounter for gynecological examination without abnormal finding             History of Present Illness   HPI  Tina Baron is a 39 y.o.  female who presents for annual exam.  She is slightly irregular menses but is continuing to breast-feed intermittently.  Her son is doing very well.  She has no complaints related to OB/GYN.  She is contemplating a second pregnancy and will time it so that the C-sections will be at an 18 to 24-month interval.  She has had no major medical problems or changes.  A Pap smear was performed today.  Will see her back either with her next pregnancy or as needed.     The following portions of the patient's history were reviewed and updated as appropriate: allergies, current medications, past family history, past medical history, past social history, past surgical history and problem list.     Review of Systems   Constitutional:  Negative for chills, diaphoresis, fatigue, fever and unexpected weight change.   HENT:  Negative for congestion, sinus pressure, sinus pain, tinnitus and trouble swallowing.    Eyes:  Negative for visual disturbance.   Respiratory:  Negative for cough, chest tightness and shortness of breath.    Cardiovascular:  Negative for chest pain, palpitations and leg swelling.   Gastrointestinal:  Negative for abdominal distention, abdominal pain, anal bleeding, constipation, diarrhea, nausea, rectal pain and vomiting.   Endocrine: Negative for heat intolerance.   Genitourinary:  Negative for difficulty urinating, dysuria, flank pain, frequency, genital sores, hematuria and urgency.   Musculoskeletal:  Negative for arthralgias, back pain and joint swelling.   Skin:  Negative for rash.   Allergic/Immunologic: Negative for environmental allergies and food  allergies.   Neurological:  Negative for headaches.   Hematological:  Negative for adenopathy. Does not bruise/bleed easily.   Psychiatric/Behavioral:  Negative for decreased concentration and dysphoric mood. The patient is not nervous/anxious.           Objective   There were no vitals taken for this visit.     Physical Exam  Vitals reviewed. Exam conducted with a chaperone present.   Constitutional:       Appearance: Normal appearance.   HENT:      Mouth/Throat:      Mouth: Mucous membranes are moist.      Pharynx: Oropharynx is clear.   Eyes:      Conjunctiva/sclera: Conjunctivae normal.      Pupils: Pupils are equal, round, and reactive to light.   Cardiovascular:      Rate and Rhythm: Normal rate and regular rhythm.      Pulses: Normal pulses.      Heart sounds: Normal heart sounds.   Pulmonary:      Effort: Pulmonary effort is normal.      Breath sounds: Normal breath sounds.   Abdominal:      General: Bowel sounds are normal.      Palpations: Abdomen is soft.   Genitourinary:     General: Normal vulva.      Exam position: Lithotomy position.      Jose stage (genital): 5.      Vagina: Normal.      Cervix: Normal.      Uterus: Normal.       Adnexa: Right adnexa normal and left adnexa normal.      Rectum: Normal.   Musculoskeletal:         General: Normal range of motion.      Cervical back: Neck supple.   Skin:     General: Skin is warm and dry.   Neurological:      General: No focal deficit present.      Mental Status: She is alert and oriented to person, place, and time.   Psychiatric:         Mood and Affect: Mood normal.

## 2025-04-03 LAB
LAB AP GYN PRIMARY INTERPRETATION: NORMAL
Lab: NORMAL

## 2025-04-10 ENCOUNTER — RESULTS FOLLOW-UP (OUTPATIENT)
Dept: OBGYN CLINIC | Facility: CLINIC | Age: 40
End: 2025-04-10

## (undated) DEVICE — SUT VICRYL 0 CTX 36 IN J978H

## (undated) DEVICE — Device

## (undated) DEVICE — EXOFIN PRECISION PEN HIGH VISCOSITY TOPICAL SKIN ADHESIVE: Brand: EXOFIN PRECISION PEN, 1G

## (undated) DEVICE — GLOVE PI ULTRA TOUCH SZ 6

## (undated) DEVICE — SKIN MARKER DUAL TIP WITH RULER CAP, FLEXIBLE RULER AND LABELS: Brand: DEVON

## (undated) DEVICE — PACK C-SECTION PBDS

## (undated) DEVICE — CHLORAPREP HI-LITE 26ML ORANGE

## (undated) DEVICE — SUT VICRYL 0 CT-1 36 IN J946H